# Patient Record
(demographics unavailable — no encounter records)

---

## 2018-01-07 NOTE — RAD
PA AND LATERAL CHEST:

 

HISTORY:

Cough.

 

COMPARISON:

11/04/2016

 

FINDINGS:

The heart size is upper normal with postop sternotomy change.  The lungs are clear.  No evidence of v
ascular congestion.

 

IMPRESSION:

No evidence of acute infiltrate.

 

POS: [facility]

## 2018-06-04 NOTE — HP
PRIMARY CARE PHYSICIAN:  Joaquin Bermudez M.D.

 

PRIMARY CARDIOLOGIST:  Dr. Lee.

 

REASON FOR ADMISSION:  Chest pain.

 

HISTORY OF PRESENT ILLNESS:  A 64-year-old -American female with a history of hypertension, dy
slipidemia, COPD, coronary artery disease and peripheral arterial disease who presented initially to 
[facility] with complaint of chest pain.  She was also having dyspnea on exertion.  
The patient reports that first time chest pain started yesterday around 3:00 p.m.  At that time, she 
was feeling nausea.  Her pain was left-sided in location, 6/10 in intensity and subsided within an ho
ur.  She was continued to complain of shortness of breath with some intermittent wheezing.  She was a
ble to go to sleep without any problem, but around 3:00 a.m., she woke up with acute onset of chest p
ain and her chest pain intensity was more severe than at during daytime.  It was radiating to left ar
m and associated with shortness of breath.  She denies any associated palpitation.  She denies any re
lation of chest pain with food, respiration or activity.  It was constant from 3:00 a.m. to 8:00 a.m.
 without any change.

 

She was evaluated at [facility].  Over there, her electrocardiograph was unremarkabl
e.  Her routine blood tests including cardiac enzyme were negative and subsequently she was transferr
ed to our emergency room for rule out ACS.  In our emergency room, patient was wheezing and that is w
hy she was given DuoNeb therapy.  After that, she was feeling better.

 

She denies any hemoptysis.  She denies any calf tenderness.  She denies any fever or chills.  She den
ies any UTI symptoms.

 

PAST MEDICAL HISTORY:  Diabetes type 2, hypertension, dyslipidemia, coronary artery disease, COPD, pe
ripheral arterial disease, osteoarthritis, gastroesophageal reflux disease, history of polysubstance 
abuse in past.

 

PAST SURGICAL HISTORY:  Thyroid surgery to remove goiter, cholecystectomy in , , CABG x3
 in .

 

PAST PSYCHIATRIC HISTORY:  Reviewed and negative.

 

FAMILY HISTORY:  Hypertension and heart disease runs among several family members.  Diabetes also run
s among several family members.

 

SOCIAL HISTORY:  Patient smokes 5-6 cigars daily basis.  She denies any current illicit drug abuse.  
She drinks alcohol occasionally.

 

ALLERGIES:  No known drug allergy.

 

REVIEW OF SYSTEMS:  The following complete review of systems was negative, unless otherwise mentioned
 in the HPI or below:

Constitutional:  Weight loss or gain, ability to conduct usual activities.

Skin:  Rash, itching.

Eyes:  Double vision, pain.

ENT/Mouth:  Nose bleeding, neck stiffness, pain, tenderness.

Cardiovascular:  Palpitations, dyspnea on exertion, orthopnea.

Respiratory:  Shortness of breath, wheezing, cough, hemoptysis, fever or night sweats.

Gastrointestinal:  Poor appetite, abdominal pain, heartburn, nausea, vomiting, constipation, or diarr
hea.

Genitourinary:  Urgency, frequency, dysuria, nocturia.

Musculoskeletal:  Pain, swelling.

Neurologic/Psychiatric:  Anxiety, depression.

Allergy/Immunologic:  Skin rash, bleeding tendency.

 

Please see my HPI for pertinent positive and negative.  All other review of systems reviewed and nega
tive except as mentioned in the HPI.

 

CURRENT HOME MEDICATIONS:  Aspirin 81 mg p.o. daily, Lipitor 40 mg p.o. at bedtime, Symbicort 2 puffs
 inhalation b.i.d., clonidine 0.1 mg p.o. b.i.d., Lasix 20 mg p.o. daily, hydralazine 50 mg t.i.d., D
uoNeb q.6 hourly p.r.n., metformin 500 mg p.o. b.i.d., Bystolic 20 mg p.o. daily, Phenergan with code
ine 5 mL q.6 hourly p.r.n., valsartan 160 mg p.o. b.i.d., Ventolin inhaler 2 puffs q.4 hourly p.r.n.

 

EMERGENCY ROOM COURSE:  Patient received DuoNeb therapy, nitroglycerin 0.4 mg sublingual x2, aspirin 
234 mg p.o. one time dose is given.

 

Repeat blood test done in our emergency room was unremarkable.

 

PHYSICAL EXAMINATION:

VITAL SIGNS:  Currently, blood pressure 178/92, pulse 86, respiratory rate 20, temperature 98.3, satu
ration 97% on room air, weight 63 kilograms.

GENERAL:  Patient is currently alert, awake, no acute distress.

HEAD:  Normocephalic, atraumatic.

EYES:  Pupils round, reactive to light.  Extraocular muscle intact.

ENT:  Oropharynx within normal limits.  Moist mucous membranes.  No oral lesion, no pharyngeal erythe
ma, no exudate.

NECK:  Supple, no JVD, no thyromegaly, no carotid bruit.

LUNGS:  Few end expiratory wheezing heard.

CARDIAC:  S1 and S2 regular.  No murmur, no gallop, no rub.

ABDOMEN:  Soft, bowel sounds present, nontender, nondistended.  No organomegaly, no mass, no suprapub
ic tenderness.

BACK:  Examination unremarkable, no CVA tenderness.

EXTREMITIES:  Upper extremity passive movement of all joints are normal.  Lower extremities:  No mary ellen
a, no calf tenderness.  Good distal pulsation.

SKIN:  No skin rash.

HEMATOLOGICAL SYSTEM:  No lymphadenopathy.

PSYCHIATRIC:  Normal affect.

 

IMAGING DATA AND SIGNIFICANT LABORATORY DATA:

1.  Chest x-ray based on my review, no acute cardiopulmonary process.

2.  CBC:  WBC 8.8, hemoglobin 14.2, platelet 247.  INR 1.0.

3.  BMP:  Sodium 140, potassium 4.3, chloride 109, carbon dioxide 21, BUN 15, creatinine 0.82, glucos
e 139, calcium 9.6, magnesium 2.2.

4.  LFT:  AST 19, ALT 24, alkaline phosphatase 79, albumin 3.9, CK 67, CK-MB 1.1, troponin I less yadiel
n 0.010 and second set is also negative, BNP 26.8, lipase 28.

5.  EKG normal sinus rhythm within normal limits.

 

ASSESSMENT AND PLAN/IMPRESSION:

1.  Acute chest pain.  Patient does have acute and recurrent chest pain, but cardiac enzymes negative
 x2 and EKG is negative.  Her BNP is normal.  She does not have any hypoxia.  At this point, patient 
is admitted for rule out acute coronary syndrome.  We will perform stress test tomorrow morning for d
iagnostic reason.  Meanwhile, we will continue with aspirin 325 mg p.o. daily, nitropatch q.8 hourly.
  We will check lipid profile for risk stratification and continue Lipitor 40 mg p.o. at bedtime and 
Bystolic 20 mg p.o. daily.

2.  Chronic obstructive pulmonary disease with mild exacerbation with bronchitis.  We will continue w
ith DuoNeb every 6 hourly along with Dulera 2 puffs inhalation b.i.d., Solu-Medrol 20 mg IV q.8 hourl
y, and Mucinex 600 mg twice daily.

3.  Hypertension.  We will continue clonidine 0.1 mg p.o. b.i.d., along with hydralazine 50 mg t.i.d.
, Bystolic 20 mg p.o. daily, and valsartan 160 mg p.o. b.i.d.  We will use hydralazine on p.r.n. basi
s.

4.  Dyslipidemia.  Check lipid profile tomorrow and continue Lipitor 40 mg p.o. at bedtime.

5.  Diabetes type 2.  Continue metformin 500 mg p.o. b.i.d. and insulin as per sliding scale per prot
ocol.  Diabetic diet will be given.

6.  Tobacco abuse disorder.  Smoking cessation counseling given.  Healthy lifestyle measures discusse
d with the patient.

7.  Coronary artery disease.  Continue aspirin, statin therapy and Bystolic as well as losartan as pe
r home dosage.  Patient is admitted for rule out acute coronary syndrome.

8.  Deep venous thrombosis prophylaxis not needed because we are expecting discharge in 24 hours.

9.  Gastrointestinal prophylaxis, Pepcid 20 mg p.o. b.i.d.

10.  Code status:  The patient is FULL CODE.  Patient's daughter is surrogate decision maker.

 

Disposition plan based on stress test result, likely within 24 hours.  Plan of care discussed with th
guilherme patient and family member at bedside in the emergency room.

## 2018-06-04 NOTE — RAD
PORTABLE CHEST 1 VIEW:

 

Date:  06/04/18 

Time:  0849 hours

 

HISTORY:  

Chest pain. 

 

FINDINGS:

 

Comparison made with exam of 01/07/18. 

 

There are changes of median sternotomy. The heart size is borderline. The lungs are well expanded wit
hout focal areas of consolidation, pneumothorax, lalit pulmonary edema, or pleural effusions. 

 

IMPRESSION: 

No acute process. 

 

 

 

POS: OFF

## 2018-06-05 NOTE — PDOC.PN
- Subjective


Encounter Start Date: 06/05/18


Encounter Start Time: 15:12





Ms. Villarreal was seen today in follow-up of chest pain. She says she is feeling 

better now, after she was given a dose of NTG. She denies feeling dizzy or 

lightheaded.





- Objective


Resuscitation Status: 


 











Resuscitation Status           FULL:Full Resuscitation














MAR Reviewed: Yes


Vital Signs & Weight: 


 Vital Signs (12 hours)











  Temp Pulse Resp BP BP Pulse Ox


 


 06/05/18 14:09   77   191/97 H  


 


 06/05/18 14:00   87   145/87 H  


 


 06/05/18 11:44   77    


 


 06/05/18 11:24  98.3 F  59 L  20   191/79 H  97


 


 06/05/18 07:34  97.4 F L  77  17   134/70  95


 


 06/05/18 07:20  97.4 F L  77  17   


 


 06/05/18 06:37   80  14   


 


 06/05/18 04:25  96.5 F L  85  18  144/63 H   93 L








 Weight











Weight                         167 lb 11.2 oz














I&O: 


 











 06/04/18 06/05/18 06/06/18





 06:59 06:59 06:59


 


Intake Total  600 


 


Balance  600 











Result Diagrams: 


 06/04/18 12:54





 06/04/18 12:54


Additional Labs: 


 Accuchecks











  06/05/18 06/04/18 06/04/18





  05:54 22:18 17:06


 


POC Glucose  275 H  123 H  163 H














Phys Exam





- Physical Examination


HEENT: PERRLA


Respiratory: no wheezing, no rales, no rhonchi, clear to auscultation bilateral


Cardiovascular: RRR, no significant murmur, no rub


Gastrointestinal: soft, non-tender, positive bowel sounds


Musculoskeletal: no edema





Dx/Plan


(1) Chest pain


Code(s): R07.9 - CHEST PAIN, UNSPECIFIED   Status: Acute   





(2) Coronary artery disease


Code(s): I25.10 - ATHSCL HEART DISEASE OF NATIVE CORONARY ARTERY W/O ANG PCTRS 

  Status: Acute   





(3) Diabetes mellitus type 2 in nonobese


Code(s): E11.9 - TYPE 2 DIABETES MELLITUS WITHOUT COMPLICATIONS   Status: Acute

   





(4) Hypertension


Code(s): I10 - ESSENTIAL (PRIMARY) HYPERTENSION   Status: Acute   





(5) COPD (chronic obstructive pulmonary disease)


Status: Acute   





(6) Tobacco abuse


Code(s): Z72.0 - TOBACCO USE   Status: Acute   





- Plan





* Chest pain- this is suspicious for angina


* The stress test was cancelled due to some arrhythmia noted earlier today, 

with ST segment changes- discussed with Dr. Haley, who will be assessing the 

patient today. Echo has been ordered


* Tobacco Abuse- discussed cessation in detail


* HTN- blood pressure has been labile- she is back on her usual medications- 

will observe and continue PRN medications


* COPD- stable- again discussed smoking cessation.

## 2018-06-05 NOTE — CON
DATE OF CONSULTATION:  2018

 

HISTORY:  Ms. Ashleigh Villarreal is a pleasant 64-year-old black female who presents 
with chest discomfort.  She was evaluated here in 2013 by Dr. Lee.  She 
had mildly elevated troponin and was to undergo cardiac catheterization; however
, no femoral, popliteal, posterior tibial or dorsalis pedis pulses were felt.  
She underwent a CTA of the abdominal aorta, which revealed that there was 
probable occlusion at the bifurcation.  She therefore underwent cardiac 
catheterization via the radial approach by Dr. Sexton.  There was a 
significant left main disease, distal 50%-60% LAD, 90%-95% stenosis of the 
circumflex and the right coronary artery was totally occluded in its proximal 
portion.  She then underwent CABG x3 by Dr. Riddle with STEINBERG to LAD, saphenous 
vein to the first obtuse marginal and saphenous vein to the diagonal (with a 
description of the procedure, the diagonal was not mentioned; however, the 
procedure in detail mentioned that there was a graft to the diagonal.)  The 
second obtuse marginal, third obtuse marginal, fourth obtuse marginal and right 
posterior descending artery were all felt to be too small and heavily diseased 
for bypass.  Apparently, her postop course was unremarkable.  It is also of 
note that during that admission, she had positive urine drug screen for cocaine 
and cannabinoids.  She apparently has not returned for Cardiology follow up 
since that time.  She was hospitalized in 2016 with COPD exacerbation.

 

She denies any exertional chest discomfort, it is mostly limited by pain in her 
right leg with walking.  Then, over the past 8 days, she has started to have 
chest pressure associated with shortness of breath and diaphoresis.  The 
episodes would last anywhere from 3-20 minutes.  The pain became more intense 
and she went to the hospital in [place] and was transferred here for 
further evaluation.  She was scheduled to undergo a Lexiscan Cardiolite testing
; however, it was noted that she had an episode of chest discomfort early this 
morning at 0113.  This was accompanied by a 2 mm of downsloping ST segment 
depression.  With this finding associated with chest pain, it was felt that she 
should not undergo Cardiolite testing.

 

PAST MEDICAL HISTORY:  Diabetes, hyperlipidemia (she states she does not take 
the atorvastatin daily), coronary artery disease, COPD, severe peripheral 
vascular disease with subtotally occluded aorta in , osteoarthritis, GERD, 
polysubstance abuse in the past.

 

OPERATIONS:  CABG, thyroid surgery for goiter,  and cholecystectomy.

 

MEDICATIONS:  At home, aspirin 81 daily, atorvastatin 40 at bedtime, carvedilol 
25 b.i.d., clonidine 0.1 mg b.i.d. p.r.n., Lasix 20 mg daily, hydralazine 50 
t.i.d., metformin 500 b.i.d., valsartan 160 b.i.d., Ventolin 2 puffs q.4 hours 
p.r.n.

 

ALLERGIES:  None.

 

SOCIAL HISTORY:  She continues to smoke 3-4 cigarettes per day.  She 
occasionally drinks alcohol.  She abuse cocaine in the past.

 

FAMILY HISTORY:  Heart disease in family members.

 

REVIEW OF SYSTEMS:  Twelve point review of systems otherwise unremarkable.

 

PHYSICAL EXAMINATION:

VITAL SIGNS:  Blood pressure 191/97, pulse of 77.

HEENT:  PERRL.

NECK:  Supple.

LUNGS:  Chest revealed bilateral rhonchi.

CARDIOVASCULAR:  S1, S2 normal, without any S3 or S4.  There is a 2/6 systolic 
ejection murmur in the aortic area.  Carotid upstrokes normal without bruits.  
Dorsalis pedis, posterior tibial pulses, femoral pulses are absent.

ABDOMEN:  Normal bowel sounds, without tenderness.

EXTREMITIES:  Revealed no clubbing, cyanosis or edema.

NEUROLOGIC:  Grossly intact.

 

LABORATORY DATA AND IMAGING:  EKG reveals normal sinus rhythm, possible left 
atrial enlargement.  She does have ST segment depression with chest pain as 
noted above.  CBC is unremarkable.  INR 1.0.  Sodium 138, potassium 4.2, 
chloride 109, carbon dioxide 20, BUN 12, creatinine 0.76.  Troponin I is 0.044.
  BNP 55.1.  Cholesterol 265, triglycerides 113, LDL 99, HDL 43.

 

IMPRESSION:

1.  Acute coronary syndrome with ST segment depression with chest discomfort, 
borderline troponin.

2.  Status post coronary artery bypass graft x3 in 2013 with a STEINBERG to LAD, 
vein graft to the diagonal and the first obtuse marginal.  The second, third, 
and fourth obtuse marginals and right posterior descending were too smaller 
disease for bypass.

3.  Aortic murmur.

4.  Subtotal versus totally occluded distal aorta on CT of the abdomen in 2013.

5.  Hypertension.

6.  Hypercholesterolemia, poorly controlled, noncompliant with atorvastatin.

7.  Diabetes.

8.  The patient continues to smoke.

9.  Positive family history.

10.  History of cocaine abuse.

 

PLAN:  Atorvastatin will be increased to 40 mg daily.  Urine drug screen will 
be obtained.  Carvedilol dose will be increased for better blood pressure 
control.  Topical nitrates will also be added.  She will be given one shot of 
Lovenox 1 mg/kg at this time.

 

Ms. Villarreal represents a significant problem in terms of the subtotally occluded 
aorta and probable inability to have her undergo cardiac catheterization from 
the femoral approach.  Probable only approach should be through a radial artery
; however, opacifying bypass grafts from this approach is extremely difficult.  
Further consultation will be held with cardiologist who do radial approach, in 
the meantime, her medications will be further increased.

 

GELACIO

## 2018-06-05 NOTE — NM
MYOCARDIAL PERFUSION EXAM:

 

INDICATIONS:

Chest pain.

 

TECHNIQUE:

The patient was given 9 millicuries of technetium sestamibi.  A rest only study was performed.  The e
xam was canceled by the cardiologist after the rest only study.

 

Activity is seen throughout the left ventricle on the rest only study.  Mild thinning of the inferior
 wall.

 

FINDINGS:

Rest only study shows no definite defect.

 

POS: [facility]

## 2018-06-06 NOTE — PDOC.PN
- Subjective


Encounter Start Date: 06/06/18


Encounter Start Time: 13:42





Ms. Villarreal was seen today in follow-up. She has not had any chest pain today. 

She is breathing ok, and denies dyspnea.





- Objective


Resuscitation Status: 


 











Resuscitation Status           FULL:Full Resuscitation














MAR Reviewed: Yes


Vital Signs & Weight: 


 Vital Signs (12 hours)











  Temp Pulse Resp BP BP Pulse Ox


 


 06/06/18 12:00   80  14   


 


 06/06/18 10:40  97.9 F  89  12   144/67 H  95


 


 06/06/18 10:17   78   144/75 H  


 


 06/06/18 08:00  98.2 F  78  12   


 


 06/06/18 07:27  98.2 F  78  12   144/65 H  95


 


 06/06/18 06:44   90  14   


 


 06/06/18 04:10  97.7 F  83  18   133/60  96


 


 06/06/18 01:50   98  18    96








 Weight











Weight                         167 lb 11.2 oz














I&O: 


 











 06/05/18 06/06/18 06/07/18





 06:59 06:59 06:59


 


Intake Total 600 480 


 


Output Total  1200 


 


Balance 600 -720 











Result Diagrams: 


 06/04/18 12:54





 06/04/18 12:54


Additional Labs: 


 Accuchecks











  06/06/18 06/06/18 06/05/18





  10:42 07:01 20:44


 


POC Glucose  190 H  201 H  162 H














  06/05/18 06/05/18 06/05/18





  18:31 16:20 11:30


 


POC Glucose  217 H  250 H  161 H














Phys Exam





- Physical Examination


HEENT: PERRLA


Respiratory: no wheezing, no rales, no rhonchi, clear to auscultation bilateral


Cardiovascular: RRR, no significant murmur, no rub


Gastrointestinal: soft, non-tender, positive bowel sounds


Musculoskeletal: no edema





Dx/Plan


(1) Chest pain


Code(s): R07.9 - CHEST PAIN, UNSPECIFIED   Status: Acute   





(2) Coronary artery disease


Code(s): I25.10 - ATHSCL HEART DISEASE OF NATIVE CORONARY ARTERY W/O ANG PCTRS 

  Status: Acute   





(3) Diabetes mellitus type 2 in nonobese


Code(s): E11.9 - TYPE 2 DIABETES MELLITUS WITHOUT COMPLICATIONS   Status: Acute

   





(4) Hypertension


Code(s): I10 - ESSENTIAL (PRIMARY) HYPERTENSION   Status: Acute   





(5) COPD (chronic obstructive pulmonary disease)


Status: Acute   





(6) Tobacco abuse


Code(s): Z72.0 - TOBACCO USE   Status: Acute   





- Plan





* Unstable Angina - She has a history of coronary artery disease, and developed 

an arrhythmia with ST segment changes- therefore will change her status to in-

patient.  She has been evaluated by Dr. Baer who plans on performing a 

cardiac cath


* UDS was noted, and the dangers and need to stop cocaine use was discussed.  (

She admits to using some on the weekends).


* HTN- blood pressure is a bit elevated- will monitor


* Dyslipidemia- she has been encouraged to be complaint with Lipitor


* Tobacco abuse- discussed smoking cessation


* DM- blood glucose is stable

## 2018-06-06 NOTE — PRG
DATE OF SERVICE:  06/06/2018

 

Ms. Villarreal is resting comfortably this morning.

 

No chest pain or pressure this morning.

 

PHYSICAL EXAMINATION: 

VITAL SIGNS:  Blood pressure 133/60, pulse is 90.  

 

I reviewed the situation with the patient.  The patient does admit to using cocaine sporadically prio
r to admission, smoking about 3 cigarettes per day.  She said she was not taking the atorvastatin on 
a regular basis.  She was taking aspirin.

 

ASSESSMENT:

1.  Unstable angina.

2.  Cocaine addiction.

3.  Previous bypass.

4.  Severe peripheral vascular disease.

5.  Diabetes.

6.  Noncompliance with medicines.

 

PLAN:  Dr. Baer will see the patient today to assess whether she is a candidate for cardiac cathete
rization.  It is very concerning that she has not been compliant with medicines.  She said she was co
mpliant with aspirin, but was taking atorvastatin sporadically.  The LDL reflects that as the LDL is 
199.

## 2018-06-07 NOTE — PDOC.PN
- Subjective


Encounter Start Date: 06/07/18


Encounter Start Time: 10:58





Ms. Villarreal was seen today in follow-up. She does not have any complaints today. 

She admits to some chest pain early this morning, after getting up to go to the 

bathroom. She says it was relieved with nitroglycerin after about 5 minutes.





- Objective


MAR Reviewed: Yes


Vital Signs & Weight: 


 Vital Signs (12 hours)











  Temp Pulse Resp BP Pulse Ox


 


 06/07/18 08:00  97 F L  83  17  144/95 H  94 L


 


 06/07/18 05:45   82   


 


 06/07/18 03:34     156/73 H 


 


 06/07/18 03:28  97.9 F  82  20  192/86 H  93 L


 


 06/07/18 00:46   90   134/64 


 


 06/07/18 00:40   89   129/65 


 


 06/07/18 00:32   88   196/93 H 


 


 06/07/18 00:12   85  12  


 


 06/07/18 00:00  98 F  59 L  16  141/62 H  97











I&O: 


 











 06/06/18 06/07/18 06/08/18





 06:59 06:59 06:59


 


Intake Total  790 


 


Balance  790 











Result Diagrams: 


 06/04/18 12:54





 06/04/18 12:54


Additional Labs: 


 Accuchecks











  06/07/18 06/07/18 06/06/18





  10:42 06:04 21:33


 


POC Glucose  275 H  260 H  261 H














  06/06/18 06/06/18





  16:36 14:07


 


POC Glucose  290 H  147 H














Phys Exam





- Physical Examination


HEENT: PERRLA


Respiratory: no wheezing, no rales, no rhonchi


Cardiovascular: RRR, no significant murmur, no rub


Gastrointestinal: soft, non-tender, positive bowel sounds


Musculoskeletal: no edema





Dx/Plan


(1) Chest pain


Code(s): R07.9 - CHEST PAIN, UNSPECIFIED   Status: Acute   





(2) Coronary artery disease


Code(s): I25.10 - ATHSCL HEART DISEASE OF NATIVE CORONARY ARTERY W/O ANG PCTRS 

  Status: Acute   





(3) Diabetes mellitus type 2 in nonobese


Code(s): E11.9 - TYPE 2 DIABETES MELLITUS WITHOUT COMPLICATIONS   Status: Acute

   





(4) Hypertension


Code(s): I10 - ESSENTIAL (PRIMARY) HYPERTENSION   Status: Acute   





(5) COPD (chronic obstructive pulmonary disease)


Status: Acute   





(6) Tobacco abuse


Code(s): Z72.0 - TOBACCO USE   Status: Acute   





- Plan





* Unstable Angina- Cath results were noted. She will be treated medically


* HTN- her blood pressure is not optimally controlled- will add Norvasc


* DM- blood glucose is slightly elevated- but will need to hold Metformin due 

to Cardiac Cath this morning.


* COPD- stable


* Disposition per Cardiology

## 2018-06-07 NOTE — PQF
CLINICAL DOCUMENTATION IMPROVEMENT CLARIFICATION FORM:  ICD-10 Updated



PLEASE DO AN ADDENDUM TO THE PROGRESS NOTE WITH ANY DOCUMENTATION UPDATES OR 
ADDITIONS AND CARRY THROUGH TO DC SUMMARY.   THANK YOU.





DATE:     6/7                                                                  
    ATTN:  DR. JODIE ISLAS



Please exercise your independent, professional judgment in responding to the 
clarification form. Clinical indicators are provided on the bottom of this form 
for your review



Please check appropriate box(s):



                         ELEVATED TROPONIN I



[  ]  D/T Demand Ischemia

[  ]  Not D/T Demand Ischemia



[  ] Other diagnosis ___________

[  ] Unable to determine



For continuity of documentation, please document condition throughout progress 
notes and discharge summary.  Thank You.



CLINICAL INDICATORS present in the medical record:



PHYSICIAN H&P DOCUMENTATION 6/4:  1)  PT DOES HAVE ACUTE & RECURRENT CHEST PAIN
, BUT CARDIAC ENZYMES NEG X2



CARDIOLOGY CONSULT 6/5:  1)  ACUTE CORONARY SYNDROME W/ST SEGMENT DEPRESSION 
WITH CHEST DISCOMFORT, BORDERLINE TROPONIN



TROP:  0.021, 0.044, 0.043, 0.036, 0.030, 0.029       (6/4 - 5)

 

RISK FACTORS:

CAD W/PAST CABG

CURRENT TOBACCO ABUSE

COCAINE ABUSE (POSITIVE UDS)

HTN

DM II



TREATMENTS:

CARDIOLOGY CONSULT

L HEART CATH

SERIAL CARDIAC ENZYMES





 THANK YOU!  Fiordaliza



(This form is maintained as a part of the permanent medical record)

 2015 [employer].  All Rights Reserved

Fiordaliza Schroeder RN, BSN    [email or web address]    Office:  [phone]

                                                              

[facility] Center[facility]

## 2018-06-08 NOTE — PRG
DATE OF SERVICE:  06/08/2018

 

HISTORY:  Ms. Villarreal is doing better.  She did have one episode of chest discomfort this morning, res
olved with nitroglycerin.

 

PHYSICAL EXAMINATION:

VITAL SIGNS:  Blood pressure was high 175/81, pulse 84.

LUNGS:  Clear.

CARDIAC:  Normal S1, S2.

ABDOMEN:  Soft, nontender.

EXTREMITIES:  There is no edema.

 

ASSESSMENT:

1.  Coronary artery disease.  Medical therapy being the only option due to distal atherosclerosis.

2.  Patent bypass grafts as outlined in the chart.

 

PLAN:

1.  Increase amlodipine to 10 mg a day.

2.  Other medicines unchanged.

3.  Add Plavix 75 mg daily and continue aspirin as well as statin and all the other medicine.  Okcurtis w
ith me to be released home.  Medical therapy is the only option.

 

The patient understands it is critical to take medicines as prescribed and avoid all cocaine or other
 drugs.

## 2019-01-09 NOTE — RAD
2 VIEWS CHEST:

 

Date:  01/09/19 

 

COMPARISON:  

01/07/18. 

 

HISTORY:  

Chest pain. 

 

FINDINGS:

Midline sternotomy wires are present. Heart and mediastinal contours are stable. Mild increased linea
r interstitial density noted with no pneumothorax, pleural fluid, focal consolidation, or alveolar ed
ema. 

 

IMPRESSION: 

No focal consolidation or alveolar edema. 

 

 

POS: [facility]

## 2019-01-09 NOTE — HP
PRIMARY CARE PHYSICIAN:  Joaquin Bermudez MD.



CHIEF COMPLAINT:  Chest pressure pain and shortness of breath.



HISTORY OF PRESENTING ILLNESS:  Ms. Villarreal is a pleasant 65-year-old Afro-American

female with known history of coronary artery disease, cocaine abuse, COPD, and

diabetes, who presented to the ER in outside hospital with the above-mentioned

complaint.  History is mainly obtained by the patient herself.  Electronic medical

records have been reviewed and the case has been discussed with admitting ER

physician. 



Ms. Villarreal reports that she ran out of her sublingual nitroglycerin 2 months ago.

She started to have symptoms only 2 or 3 days ago.  She reports a chest pressure

that builds up and does not go away and is associated with mild shortness of breath

and some diaphoresis.  Initially, she thought that this is gastroesophageal reflux,

but it kept coming on even when she has not eaten anything.  She got concerned and

came to the emergency room. 



She denies any recent illnesses.  She was last admitted to our facility in 2018

and underwent a cardiac catheterization at that time.  She has history of coronary

artery disease and bypass graft in .  Catheterization in 2018 showed

diffuse atherosclerosis and medical therapy was recommended.  The patient has failed

to follow up with a cardiologist, Dr. Lee since then, but reports that she did go

and see Dr. Riddle for followup for some reason.  Please note that the patient was

discharged on aspirin, Plavix, statin during her last hospitalization, among others.

 At this time, she reports compliance with everything except for sublingual

nitroglycerin which she ran out of. 



Please note that the patient is still smoking at least 2 cigarettes a day and on and

off cocaine abuse.  Her last cocaine use was 3 days ago when she had a visit from

her friend. 



Upon presentation to the ER, she was hemodynamically stable with a blood pressure of

159/71, saturating 93% on room air, pulse is 76.  She was found to have mild

wheezing on examination and some shortness of breath.  She received nebulizers.

Further evaluation showed elevation of cardiac enzymes.  Initial troponin was 0.088

with repeat troponin of 0.102 and with repeat troponin of 0.150.  She received 1

mg/kg of Lovenox along with aspirin and transdermal nitroglycerin, sublingual

nitroglycerin and was transferred to our facility for further evaluation and

possible admission for non-ST elevation MI.  Chest x-ray is clear. 



She is now being admitted for same.



PAST MEDICAL HISTORY:  

1. History of coronary artery disease, status post CABG in 2013.

2. Cocaine abuse.

3. Tobacco abuse.

4. Diabetes mellitus type 2.

5. Hypertension.

6. Dyslipidemia.

7. COPD.

8. Peripheral arterial disease.

9. Osteoarthritis.



PAST SURGICAL HISTORY:  Coronary artery bypass graft in , thyroid surgery to

remove goiter, cholecystectomy in , . 



PSYCHIATRIC HISTORY:  Reviewed and negative.



FAMILY HISTORY:  Hypertension, heart disease run among several family members,

diabetes also run among several family members. 



SOCIAL HISTORY:  Smokes 1-2 cigarettes daily.  Snorts cocaine infrequently.  No

alcohol abuse. 



ALLERGIES:  NO KNOWN DRUG ALLERGIES.



CURRENT MEDICATIONS:  Unknown.  Does take metformin and multiple blood pressure

medication according to the patient. 



REVIEW OF SYSTEMS:  A 12-point review of system is done and is negative except for

those mentioned in the history and physical. 



CODE STATUS:  Full code discussed with the patient.



LABORATORY DATA:  CBC unremarkable.  Serum chemistries; chloride 110, bicarb 20.

BNP normal.  Cardiac enzymes, troponin as above.  Lipase normal. 



DIAGNOSTIC DATA:  Chest x-ray by my review has no evidence to suggest pleural

effusion, edema, or infiltrate.  A 12-lead EKG shows nonspecific ST and T-wave

changes with normal sinus rhythm by my review. 



PHYSICAL EXAMINATION:

VITAL SIGNS:  Upon presentation to the emergency room, blood pressure 159/71, pulse

is 76, respirations 17, saturating 93% on room air. 

GENERAL:  Lying comfortably in bed.  Trying to go to sleep.  No acute distress.

Awake, alert, and oriented x3. 

HEENT:  Mucous membrane is moist and pink.  No oropharyngeal exudate or erythema.

Head is normocephalic, atraumatic.  Pupils equal, reactive to light and

accommodation.  Extraocular movement intact. 

NECK:  Supple without any lymphadenopathy, JVD, or bruit. 

CHEST:  Clear to auscultation without any wheezing, rales, or rhonchi.  Rate and

rhythm regular without any murmurs, rubs, or gallops. 

ABDOMEN:  Obese, soft, nontender, nondistended with positive bowel sounds. 

EXTREMITIES:  Free of any cyanosis, clubbing, or edema. 

NEUROLOGICAL:  Nonfocal. 

SKIN:  Free of any rashes or bruises.  Feels warm and dry to touch. 

PSYCHIATRIC:  Normal affect.



IMPRESSION AND PLAN:  

1. Unstable angina.  The patient has known history of coronary artery disease and

inoperable atherosclerosis.  Medical management was recommended during her last

hospitalization.  At this time, we will also treat her medically with daily b.i.d.

Lovenox at therapeutic dose along with continuation of aspirin.  I am not sure if

the patient is still taking the Plavix or not.  I will restart her on sublingual

nitroglycerin and transdermal nitroglycerin as well as isosorbide for her anginal

symptoms.  At this time, she is hemodynamically stable.  We will have Cardiology see

her in the morning and perform a transthoracic echocardiogram. 

2. Chronic obstructive pulmonary disease, currently well controlled.  We will use

nebulizers as needed and restart her home medication that includes Symbicort. 

3. Hypertension.  Restart home medications once confirmed.

4. Dyslipidemia.  Restart statin once the dose is confirmed.

5. Diabetes mellitus type 2.  Hold metformin while she is in the hospital and start

her on insulin sliding scale with frequent Accu-Cheks. 

6. Cocaine abuse and tobacco abuse.  Extensive counseling has been provided.

7. Code status.  Full code discussed with the patient.

8. DVT and GI prophylaxis.



DISPOSITION:  Ms. Villarreal is currently being admitted to the hospital for unstable

angina. 



ESTIMATED LENGTH OF STAY:  At this time is at least 2 to 3 midnights.  Further

management will depend upon her clinical course. 







Job ID:  [id number]

## 2019-01-10 NOTE — PDOC.PN
- Subjective


Encounter Start Date: 01/10/19


Encounter Start Time: 15:15


Subjective: feels well. no more CP/SOB





- Objective


MAR Reviewed: Yes


Vital Signs & Weight: 


 Vital Signs (12 hours)











  Temp Pulse Resp BP Pulse Ox


 


 01/10/19 11:30  98.0 F  65  17  128/59 L  99


 


 01/10/19 08:09  97.8 F  81  16  166/86 H  100


 


 01/10/19 07:23   65  16   98


 


 01/10/19 04:35  98.2 F  64  22 H  120/57 L  97








 Weight











Weight                         162 lb 12.8 oz














I&O: 


 











 01/09/19 01/10/19 01/11/19





 06:59 06:59 06:59


 


Intake Total  350 


 


Output Total  300 


 


Balance  50 











Result Diagrams: 


 01/10/19 05:05





 01/10/19 05:05


Additional Labs: 


 Accuchecks











  01/10/19 01/10/19 01/09/19





  12:41 05:46 21:03


 


POC Glucose  343 H  140 H  162 H














Phys Exam





- Physical Examination


Constitutional: NAD


HEENT: PERRLA, moist MMs, sclera anicteric, oral pharynx no lesions


Neck: no nodes, no JVD, supple, full ROM


Respiratory: no wheezing, no rales, no rhonchi, clear to auscultation bilateral


Cardiovascular: RRR, no significant murmur, no rub, irregular


Gastrointestinal: soft, non-tender, no distention, positive bowel sounds


Musculoskeletal: no edema, pulses present


Neurological: non-focal, normal sensation, moves all 4 limbs


Psychiatric: normal affect, A&O x 3





Dx/Plan


(1) NSTEMI (non-ST elevated myocardial infarction)


Code(s): I21.4 - NON-ST ELEVATION (NSTEMI) MYOCARDIAL INFARCTION   Status: 

Acute   





(2) COPD (chronic obstructive pulmonary disease)


Status: Acute   





(3) Coronary artery disease


Code(s): I25.10 - ATHSCL HEART DISEASE OF NATIVE CORONARY ARTERY W/O ANG PCTRS 

  Status: Acute   





(4) Diabetes mellitus type 2 in nonobese


Code(s): E11.9 - TYPE 2 DIABETES MELLITUS WITHOUT COMPLICATIONS   Status: Acute

   





(5) Hypertension


Code(s): I10 - ESSENTIAL (PRIMARY) HYPERTENSION   Status: Acute   





(6) Tobacco abuse


Code(s): Z72.0 - TOBACCO USE   Status: Acute   





- Plan


respiratory therapy, incentive spirometry, DVT proph w/SCDs


cont bid lovenox.cont ASa,statin,ARB,BB


-: Imdur for aniginal symptoms


-: smoking and cocaine cessation highly recommenede


-: ECHO pending. Cardiology recs requested





* .








Review of Systems





- Review of Systems


Constitutional: negative: fever, chills, sweats, weakness, malaise, other


ENT: negative: Ear Pain, Ear Discharge, Nose Pain, Nose Discharge, Nose 

Congestion, Mouth Pain, Mouth Swelling, Throat Pain, Throat Swelling, Other


Respiratory: negative: Cough, Dry, Shortness of Breath, Hemoptysis, SOB with 

Excertion, Pleuritic Pain, Sputum, Wheezing


Cardiovascular: negative: chest pain, palpitations, orthopnea, paroxysmal 

nocturnal dyspnea, edema, light headedness, other


Gastrointestinal: negative: Nausea, Vomiting, Abdominal Pain, Diarrhea, 

Constipation, Melena, Hematochezia, Other


Genitourinary: negative: Dysuria, Frequency, Incontinence, Hematuria, Retention

, Other


Neurological: negative: Weakness, Numbness, Incoordination, Change in Speech, 

Confusion, Seizures, Other





- Medications/Allergies


Allergies/Adverse Reactions: 


 Allergies











Allergy/AdvReac Type Severity Reaction Status Date / Time


 


No Known Drug Allergies Allergy Unknown  Verified 06/04/18 15:32











Medications: 


 Current Medications





Acetaminophen (Tylenol)  650 mg PO Q4H PRN


   PRN Reason: Headache/Fever/Mild Pain (1-3)


Albuterol/Ipratropium (Duoneb)  3 ml NEB [id number] PRN


   PRN Reason: SOB &/or Wheezing


   Last Admin: 01/09/19 22:08 Dose:  3 ml


Aspirin (Aspirin Chewable)  81 mg PO QAM-WM [facility]


Atorvastatin Calcium (Lipitor)  40 mg PO HS [facility]


Benzonatate (Tessalon)  100 mg PO Q6H PRN


   PRN Reason: Cough


Bisacodyl (Dulcolax)  10 mg PO DAILYPRN PRN


   PRN Reason: Constipation


Clonidine (Catapres)  0.1 mg PO Q4H PRN


   PRN Reason: SBP > 160____


   Last Admin: 01/09/19 22:46 Dose:  0.1 mg


Clonidine (Catapres)  0.1 mg PO BID [facility]


Clopidogrel Bisulfate (Plavix)  75 mg PO DAILY [facility]


   Last Admin: 01/10/19 09:30 Dose:  75 mg


Dextrose/Water (Dextrose 50%)  25 gm SLOW IVP PRN PRN


   PRN Reason: Hypoglycemia


Enoxaparin Sodium (Lovenox)  60 mg SC 0900,2100 [facility]


   Last Admin: 01/10/19 09:04 Dose:  60 mg


Famotidine (Pepcid)  20 mg PO BID [facility]


   Last Admin: 01/09/19 20:46 Dose:  20 mg


Glucagon (Glucagon)  1 mg IM PRN PRN


   PRN Reason: Hypoglycemia


Hydralazine HCl (Apresoline)  10 mg SLOW IVP Q4H PRN


   PRN Reason: SBP > 180 and HR < 70


Dextrose/Water (D5w)  1,000 mls @ 0 mls/hr IV .Q0M PRN


   PRN Reason: Hypoglycemia


Insulin Human Lispro (Humalog)  0 units SC .MODERATE SLIDING SC PRN


   PRN Reason: Moderate Correctional Scale


   Last Admin: 01/10/19 12:59 Dose:  8 units


Insulin Human Lispro (Humalog)  0 units SC .BEDTIME SLIDING SC PRN


   PRN Reason: Bedtime Correctional Scale


Isosorbide Mononitrate (Imdur Er)  30 mg PO DAILY [facility]


   Last Admin: 01/10/19 09:04 Dose:  30 mg


Mometasone Furoate/Formoterol Fumar (Dulera 100 Mcg/5 Mcg Inhaler)  1 puff INH 

BID-RT [facility]


   Last Admin: 01/10/19 07:23 Dose:  1 puff


Nitroglycerin (Nitrostat)  0.4 mg SL Q5MIN PRN


   PRN Reason: Chest Pain


   Last Admin: 01/10/19 05:26 Dose:  0.4 mg


Nitroglycerin (Nitro-Bid 2% Ointment)  0.5 inch TOP Q8HR [facility]


   Last Admin: 01/10/19 14:38 Dose:  0.5 inch


Ondansetron HCl (Zofran)  4 mg IVP Q6H PRN


   PRN Reason: Nausea/Vomiting


Senna/Docusate Sodium (Senokot S)  2 tab PO BID PRN


   PRN Reason: Constipation


Sodium Chloride (Flush - Normal Saline)  10 ml IVF Q12HR [facility]


   Last Admin: 01/10/19 08:18 Dose:  10 ml


Sodium Chloride (Flush - Normal Saline)  10 ml IVF PRN PRN


   PRN Reason: Saline Flush


Valsartan (Diovan)  160 mg PO BID [facility]

## 2019-01-11 NOTE — PDOC.PN
- Subjective


Encounter Start Date: 01/11/19


Encounter Start Time: 14:03


Subjective: feels well. no more chest pain.no new complaints





- Objective


MAR Reviewed: Yes


Vital Signs & Weight: 


 Vital Signs (12 hours)











  Temp Pulse Resp BP BP Pulse Ox


 


 01/11/19 11:30  98.1 F  71  19   139/68  100


 


 01/11/19 09:24      151/72 H 


 


 01/11/19 08:28  97.1 F L   18   191/88 H  100


 


 01/11/19 07:05   73  16    95


 


 01/11/19 03:19  98.0 F  63  20  150/67 H   97








 Weight











Weight                         166 lb 3.2 oz














I&O: 


 











 01/10/19 01/11/19 01/12/19





 06:59 06:59 06:59


 


Intake Total 350 938 


 


Output Total 300 200 


 


Balance 50 738 











Result Diagrams: 


 01/11/19 05:20





 01/11/19 05:20


Additional Labs: 


 Accuchecks











  01/11/19 01/11/19 01/10/19





  10:57 05:15 20:25


 


POC Glucose  122 H  180 H  192 H














  01/10/19





  16:39


 


POC Glucose  136 H














Phys Exam





- Physical Examination


Constitutional: NAD


HEENT: PERRLA, moist MMs, sclera anicteric, oral pharynx no lesions


Neck: no nodes, no JVD, supple, full ROM


Respiratory: no wheezing, no rales, no rhonchi, clear to auscultation bilateral


Cardiovascular: RRR, no significant murmur, no rub


Gastrointestinal: soft, non-tender, no distention, positive bowel sounds


Musculoskeletal: no edema, pulses present


Neurological: non-focal, normal sensation, moves all 4 limbs


Psychiatric: normal affect, A&O x 3


Skin: no rash





Dx/Plan


(1) NSTEMI (non-ST elevated myocardial infarction)


Code(s): I21.4 - NON-ST ELEVATION (NSTEMI) MYOCARDIAL INFARCTION   Status: 

Acute   Comment: Lovenox BID.Satrted on ASA,statin,BB,Imdur,ARB,Plavix   





(2) COPD (chronic obstructive pulmonary disease)


Status: Acute   





(3) Coronary artery disease


Code(s): I25.10 - ATHSCL HEART DISEASE OF NATIVE CORONARY ARTERY W/O ANG PCTRS 

  Status: Acute   Comment: inoperable disease.Medical management   





(4) Diabetes mellitus type 2 in nonobese


Code(s): E11.9 - TYPE 2 DIABETES MELLITUS WITHOUT COMPLICATIONS   Status: Acute

   





(5) Hypertension


Code(s): I10 - ESSENTIAL (PRIMARY) HYPERTENSION   Status: Acute   





(6) Tobacco abuse


Code(s): Z72.0 - TOBACCO USE   Status: Acute   





- Plan


DVT proph w/SCDs


cont BID lovenox for another 24 hours or as per cardiology.


-: started on appropriate cardiac meds.compliance encouraged


-: HD stable.


-: DC home next 24 hours





* .








Review of Systems





- Review of Systems


Constitutional: negative: fever, chills, sweats, weakness, malaise, other


ENT: negative: Ear Pain, Ear Discharge, Nose Pain, Nose Discharge, Nose 

Congestion, Mouth Pain, Mouth Swelling, Throat Pain, Throat Swelling, Other


Respiratory: negative: Cough, Dry, Shortness of Breath, Hemoptysis, SOB with 

Excertion, Pleuritic Pain, Sputum, Wheezing


Cardiovascular: negative: chest pain, palpitations, orthopnea, paroxysmal 

nocturnal dyspnea, edema, light headedness, other


Gastrointestinal: negative: Nausea, Vomiting, Abdominal Pain, Diarrhea, 

Constipation, Melena, Hematochezia, Other


Genitourinary: negative: Dysuria, Frequency, Incontinence, Hematuria, Retention

, Other


Musculoskeletal: negative: Neck Pain, Shoulder Pain, Arm Pain, Back Pain, Hand 

Pain, Leg Pain, Foot Pain, Other


Neurological: negative: Weakness, Numbness, Incoordination, Change in Speech, 

Confusion, Seizures, Other





- Medications/Allergies


Allergies/Adverse Reactions: 


 Allergies











Allergy/AdvReac Type Severity Reaction Status Date / Time


 


No Known Drug Allergies Allergy Unknown  Verified 06/04/18 15:32











Medications: 


 Current Medications





Acetaminophen (Tylenol)  650 mg PO Q4H PRN


   PRN Reason: Headache/Fever/Mild Pain (1-3)


Albuterol/Ipratropium (Duoneb)  3 ml NEB Q3AR-XS PRN


   PRN Reason: SOB &/or Wheezing


   Last Admin: 01/09/19 22:08 Dose:  3 ml


Aspirin (Aspirin Chewable)  81 mg PO QAM-WM [facility]


   Last Admin: 01/11/19 08:31 Dose:  81 mg


Atorvastatin Calcium (Lipitor)  40 mg PO HS [facility]


   Last Admin: 01/10/19 20:51 Dose:  40 mg


Benzonatate (Tessalon)  100 mg PO Q6H PRN


   PRN Reason: Cough


Bisacodyl (Dulcolax)  10 mg PO DAILYPRN PRN


   PRN Reason: Constipation


Carvedilol (Coreg)  3.125 mg PO BID-[facility]


   Last Admin: 01/11/19 08:31 Dose:  3.125 mg


Clonidine (Catapres)  0.1 mg PO Q4H PRN


   PRN Reason: SBP > 160____


   Last Admin: 01/09/19 22:46 Dose:  0.1 mg


Clonidine (Catapres)  0.2 mg PO BID [facility]


Clopidogrel Bisulfate (Plavix)  75 mg PO DAILY [facility]


   Last Admin: 01/11/19 08:31 Dose:  75 mg


Dextrose/Water (Dextrose 50%)  25 gm SLOW IVP PRN PRN


   PRN Reason: Hypoglycemia


Enoxaparin Sodium (Lovenox)  60 mg SC 0900,2100 [facility]


   Last Admin: 01/11/19 08:31 Dose:  60 mg


Famotidine (Pepcid)  20 mg PO BID [facility]


   Last Admin: 01/11/19 08:32 Dose:  20 mg


Glucagon (Glucagon)  1 mg IM PRN PRN


   PRN Reason: Hypoglycemia


Hydralazine HCl (Apresoline)  10 mg SLOW IVP Q4H PRN


   PRN Reason: SBP > 180 and HR < 70


Dextrose/Water (D5w)  1,000 mls @ 0 mls/hr IV .Q0M PRN


   PRN Reason: Hypoglycemia


Insulin Human Lispro (Humalog)  0 units SC .MODERATE SLIDING SC PRN


   PRN Reason: Moderate Correctional Scale


   Last Admin: 01/10/19 12:59 Dose:  8 units


Insulin Human Lispro (Humalog)  0 units SC .BEDTIME SLIDING SC PRN


   PRN Reason: Bedtime Correctional Scale


Isosorbide Mononitrate (Imdur)  60 mg PO DAILY [facility]


Mometasone Furoate/Formoterol Fumar (Dulera 100 Mcg/5 Mcg Inhaler)  1 puff INH 

BID-RT [facility]


   Last Admin: 01/11/19 07:05 Dose:  1 puff


Nitroglycerin (Nitrostat)  0.4 mg SL Q5MIN PRN


   PRN Reason: Chest Pain


   Last Admin: 01/10/19 23:20 Dose:  0.4 mg


Nitroglycerin (Nitro-Bid 2% Ointment)  0.5 inch TOP Q8HR [facility]


   Last Admin: 01/11/19 05:07 Dose:  0.5 inch


Ondansetron HCl (Zofran)  4 mg IVP Q6H PRN


   PRN Reason: Nausea/Vomiting


Senna/Docusate Sodium (Senokot S)  2 tab PO BID PRN


   PRN Reason: Constipation


Sodium Chloride (Flush - Normal Saline)  10 ml IVF Q12HR [facility]


   Last Admin: 01/11/19 08:31 Dose:  Not Given


Sodium Chloride (Flush - Normal Saline)  10 ml IVF PRN PRN


   PRN Reason: Saline Flush


Valsartan (Diovan)  160 mg PO BID [facility]


   Last Admin: 01/11/19 08:32 Dose:  160 mg

## 2019-01-11 NOTE — CON
DATE OF CONSULTATION:  



HISTORY OF PRESENT ILLNESS:  The patient is a pleasant 65-year-old woman, who 
presents with recurrent chest discomfort.  The patient has a long history of

coronary artery disease.  She has previously undergone a coronary bypass graft 
surgery in .  She had a LIMA placed to the LAD, saphenous vein graft to OM 
and

diagonal branch.  The patient also has been admitted on several occasions with 
unstable angina.  She most recently underwent a repeat catheterization.  In 
 of

this year, she was found to have no lesions amenable to cardiac intervention 
and has been placed on medical therapy.  The patient unfortunately has a long 
history of

substance abuse.  She continues to use cocaine and to smoke.  The patient 
states that she presents again with increasing chest discomfort.  The patient 
states she

was out of several cardiac medications including aspirin.  The patient also ran 
out of nitroglycerin.  She presented to the local emergency room.  She received 
multiple

doses of sublingual nitroglycerin and eventually, her chest discomfort 
resolved. The patient denies having any present chest discomfort. 



PAST MEDICAL HISTORY:  

1. Coronary artery disease.

2. Hypertension.

3. Peripheral vascular disease.

4. GE reflux.



PAST SURGICAL HISTORY:  Coronary bypass surgery, , and cholecystectomy.



MEDICATIONS:  See nursing list.



ALLERGIES:  NO KNOWN DRUG ALLERGIES.



SOCIAL HISTORY:  The patient continues to use cocaine and smokes several 
cigarettes

a day. 



FAMILY HISTORY:  Positive family history of heart disease.



REVIEW OF SYSTEMS:  Ten-point systems otherwise unremarkable.



PHYSICAL EXAMINATION:

GENERAL:  Obese woman, in no acute distress. 

VITAL SIGNS:  Blood pressure 166/86. 

NECK:  No jugular venous distention. 

LUNGS:  Clear to auscultation. 

HEART:  Regular rate and rhythm.  Normal S1 and S2 with no murmurs. 

ABDOMEN:  Nondistended. 

EXTREMITIES:  Show no edema. 

SKIN:  Warm and dry. 

NEUROLOGIC:  Nonfocal. 

VASCULAR:  Radial pulses are 2+.



LABORATORY RESULTS:  Her white blood cell count 8.9, hemoglobin 11.9, hematocrit

37.7, platelets 229.  Sodium 140, potassium 3.4, chloride 109, bicarb 24, BUN 13
,

creatinine 0.73, glucose 155.  BNP was 87.  EKG revealed her to have normal 
sinus

rhythm with ST-T wave abnormality suggestive of ischemia. 



IMPRESSION:  

1. Unstable angina.

2. History of coronary artery bypass surgery.

3. Hypertension.

4. Diabetes mellitus.

5. Substance abuse.

6. Peripheral vascular disease.

7. Noncompliance.  



This patient presents with unstable angina.  She has been out of her 
medications and  has continued to use cocaine.  From a cardiac standpoint, we 
will treat the patient

for several days with subcutaneous Lovenox.  I have explained the life 
threatening consequences of continued noncompliance with her medications and 
the use of illicit

drugs.  We will follow this patient with you through her hospitalization. 







Job ID:  [id number]



MTDD

## 2019-01-11 NOTE — PQF
CLINICAL DOCUMENTATION IMPROVEMENT CLARIFICATION FORM:  ICD-10 Updated

PLEASE DO AN ADDENDUM TO THE PROGRESS NOTE WITH ANY DOCUMENTATION UPDATES OR 
ADDITIONS AND CARRY THROUGH TO DC SUMMARY.   THANK YOU.



DATE: 1/11/19                                   ATTN: Dr. Calderon



Please exercise your independent, professional judgment in responding to the 
clarification form. 

Clinical indicators are provided on the bottom of this form for your review



Please check appropriate box(s):

[  ] Acute Coronary Syndrome (ACS) without Acute MI meaning Unstable Angina

[ X ] NSTEMI  

[  ] Other diagnosis ___________

[  ] Unable to determine



In addition, please specify:

Present on Admission (POA):  [X ] Yes             [  ] No             [  ] 
Unable to determine



CLINICAL INDICATORS - SIGNS / SYMPTOMS / LABS



H&P 1/9: Initial troponin was 0.088 w/ repeat troponin of 0.102 and repeat 
troponin of 0.150

               She received 1mg/kg of Lovenox along w/ aspirin and transdermal 
nitroglycerin, 

               sublingual nitroglycerin and transferred to our facility for 
further evaluation

              Unstable angina. 



Cardiology 1/10: EKG revealed her to have normal sinus rhythm with ST-T wave 
abnormality 

                                 suggestive of ischemia. 

                         Unstable angina



PN 1/10: NSTEMI (non-ST elevated myocardial infarction). Acute 





RISKS:

H&P: Hx of CAD and bypass graft in 2013. cocaine abuse, COPD, DM. Inoperable 
atherosclerosis



TREATMENT:

H&P: Treat her medically with daily bid Lovenox at therapeutic dose along with 
continuation of aspirin.

_______________________________________________________________________________



Thank you,

Cheyenne

(This form is maintained as a part of the permanent medical record)

 2015 [employer].  All Rights Reserved

Cheyenne Jackson RN, BSN    [email or web address]    Office: [phone]

                                                              

[facility] CenterJOSÉ

## 2019-01-12 NOTE — PDOC.PN
- Subjective


Encounter Start Date: 01/12/19


Encounter Start Time: 13:27


Subjective: on and off chest pain w diaphoresis and SOB





- Objective


MAR Reviewed: Yes


Vital Signs & Weight: 


 Vital Signs (12 hours)











  Temp Pulse Resp BP BP Pulse Ox


 


 01/12/19 12:21  98 F  62  18   117/57 L  99


 


 01/12/19 11:09   69  16    98


 


 01/12/19 09:40     155/75 H  


 


 01/12/19 07:52  97.8 F  64  18   172/77 H  100


 


 01/12/19 07:13       100


 


 01/12/19 07:12   68  16    100


 


 01/12/19 07:10   58 L  16    100


 


 01/12/19 04:34  97.8 F  68  19   139/68  100


 


 01/12/19 03:58   67  16    98








 Weight











Weight                         166 lb 3.2 oz














I&O: 


 











 01/11/19 01/12/19 01/13/19





 06:59 06:59 06:59


 


Intake Total 938 1440 


 


Output Total 200 500 


 


Balance 738 940 











Result Diagrams: 


 01/11/19 05:20





 01/11/19 05:20


Additional Labs: 


 Accuchecks











  01/12/19 01/12/19 01/11/19





  10:47 05:56 21:03


 


POC Glucose  133 H  186 H  256 H














  01/11/19





  16:34


 


POC Glucose  179 H














Phys Exam





- Physical Examination


anxious appearing.lying in bed


HEENT: PERRLA, moist MMs, sclera anicteric, oral pharynx no lesions


Neck: no nodes, no JVD, supple, full ROM


Respiratory: no wheezing, no rales, no rhonchi


Cardiovascular: RRR, no significant murmur


Gastrointestinal: soft, non-tender, no distention, positive bowel sounds


Musculoskeletal: no edema, pulses present


Neurological: non-focal, normal sensation, moves all 4 limbs


Psychiatric: normal affect, A&O x 3


Skin: no rash





Dx/Plan


(1) NSTEMI (non-ST elevated myocardial infarction)


Code(s): I21.4 - NON-ST ELEVATION (NSTEMI) MYOCARDIAL INFARCTION   Status: 

Acute   Comment: Lovenox BID.Satrted on ASA,statin,BB,Imdur,ARB,Plavix   





(2) COPD (chronic obstructive pulmonary disease)


Status: Acute   





(3) Coronary artery disease


Code(s): I25.10 - ATHSCL HEART DISEASE OF NATIVE CORONARY ARTERY W/O ANG PCTRS 

  Status: Acute   Comment: inoperable disease.Medical management   





(4) Diabetes mellitus type 2 in nonobese


Code(s): E11.9 - TYPE 2 DIABETES MELLITUS WITHOUT COMPLICATIONS   Status: Acute

   





(5) Hypertension


Code(s): I10 - ESSENTIAL (PRIMARY) HYPERTENSION   Status: Acute   





(6) Tobacco abuse


Code(s): Z72.0 - TOBACCO USE   Status: Acute   





- Plan


DVT proph w/SCDs


pt candelario having Unstable angina.unfortunately she has inoperable Dz


-: will cont symptom management  & anti thrombotic Rx w BID lovenox


-: PRN SubLingual NG


-: Hd stable.ECHO stable.


-: imdur increased yesterday.monitor.cardiology also following





* .








Review of Systems





- Review of Systems


Constitutional: negative: fever, chills, sweats, weakness, malaise, other


ENT: negative: Ear Pain, Ear Discharge, Nose Pain, Nose Discharge, Nose 

Congestion, Mouth Pain, Mouth Swelling, Throat Pain, Throat Swelling, Other


Respiratory: SOB with Excertion.  negative: Cough, Dry, Shortness of Breath, 

Hemoptysis, Pleuritic Pain, Sputum, Wheezing


Cardiovascular: chest pain.  negative: palpitations, orthopnea, paroxysmal 

nocturnal dyspnea, edema, light headedness, other


Gastrointestinal: negative: Nausea, Vomiting, Abdominal Pain, Diarrhea, 

Constipation, Melena, Hematochezia, Other


Genitourinary: negative: Dysuria, Frequency, Incontinence, Hematuria, Retention

, Other


Neurological: negative: Weakness, Numbness, Incoordination, Change in Speech, 

Confusion, Seizures, Other





- Medications/Allergies


Allergies/Adverse Reactions: 


 Allergies











Allergy/AdvReac Type Severity Reaction Status Date / Time


 


No Known Drug Allergies Allergy Unknown  Verified 06/04/18 15:32











Medications: 


 Current Medications





Acetaminophen (Tylenol)  650 mg PO Q4H PRN


   PRN Reason: Headache/Fever/Mild Pain (1-3)


Albuterol/Ipratropium (Duoneb)  3 ml NEB [id number] PRN


   PRN Reason: SOB &/or Wheezing


   Last Admin: 01/12/19 11:09 Dose:  3 ml


Aspirin (Aspirin Chewable)  81 mg PO QAM-[facility]


   Last Admin: 01/12/19 09:40 Dose:  81 mg


Atorvastatin Calcium (Lipitor)  40 mg PO HS [facility]


   Last Admin: 01/11/19 21:04 Dose:  40 mg


Benzonatate (Tessalon)  100 mg PO Q6H PRN


   PRN Reason: Cough


Bisacodyl (Dulcolax)  10 mg PO DAILYPRN PRN


   PRN Reason: Constipation


   Last Admin: 01/12/19 09:41 Dose:  10 mg


Carvedilol (Coreg)  3.125 mg PO BID-[facility]


   Last Admin: 01/12/19 09:39 Dose:  3.125 mg


Clonidine (Catapres)  0.1 mg PO Q4H PRN


   PRN Reason: SBP > 160____


   Last Admin: 01/09/19 22:46 Dose:  0.1 mg


Clonidine (Catapres)  0.2 mg PO BID [facility]


   Last Admin: 01/12/19 09:40 Dose:  0.2 mg


Clopidogrel Bisulfate (Plavix)  75 mg PO DAILY [facility]


   Last Admin: 01/12/19 09:41 Dose:  75 mg


Dextrose/Water (Dextrose 50%)  25 gm SLOW IVP PRN PRN


   PRN Reason: Hypoglycemia


Enoxaparin Sodium (Lovenox)  60 mg SC 0900,2100 [facility]


   Last Admin: 01/12/19 09:42 Dose:  60 mg


Famotidine (Pepcid)  20 mg PO BID [facility]


   Last Admin: 01/12/19 09:40 Dose:  20 mg


Glucagon (Glucagon)  1 mg IM PRN PRN


   PRN Reason: Hypoglycemia


Hydralazine HCl (Apresoline)  10 mg SLOW IVP Q4H PRN


   PRN Reason: SBP > 180 and HR < 70


Dextrose/Water (D5w)  1,000 mls @ 0 mls/hr IV .Q0M PRN


   PRN Reason: Hypoglycemia


Insulin Human Lispro (Humalog)  0 units SC .MODERATE SLIDING SC PRN


   PRN Reason: Moderate Correctional Scale


   Last Admin: 01/12/19 09:41 Dose:  2 units


Insulin Human Lispro (Humalog)  0 units SC .BEDTIME SLIDING SC PRN


   PRN Reason: Bedtime Correctional Scale


   Last Admin: 01/11/19 21:05 Dose:  2 unit


Isosorbide Mononitrate (Imdur)  60 mg PO DAILY [facility]


   Last Admin: 01/12/19 09:40 Dose:  60 mg


Mometasone Furoate/Formoterol Fumar (Dulera 100 Mcg/5 Mcg Inhaler)  1 puff INH 

BID-RT [facility]


   Last Admin: 01/12/19 07:10 Dose:  1 puff


Nitroglycerin (Nitrostat)  0.4 mg SL Q5MIN PRN


   PRN Reason: Chest Pain


   Last Admin: 01/12/19 08:56 Dose:  0.4 mg


Nitroglycerin (Nitro-Bid 2% Ointment)  0.5 inch TOP Q8HR [facility]


   Last Admin: 01/12/19 06:48 Dose:  0.5 inch


Ondansetron HCl (Zofran)  4 mg IVP Q6H PRN


   PRN Reason: Nausea/Vomiting


Senna/Docusate Sodium (Senokot S)  2 tab PO BID PRN


   PRN Reason: Constipation


Sodium Chloride (Flush - Normal Saline)  10 ml IVF Q12HR [facility]


   Last Admin: 01/12/19 09:41 Dose:  10 ml


Sodium Chloride (Flush - Normal Saline)  10 ml IVF PRN PRN


   PRN Reason: Saline Flush


Valsartan (Diovan)  160 mg PO BID [facility]


   Last Admin: 01/12/19 09:39 Dose:  160 mg English

## 2019-01-12 NOTE — EKG
Test Reason : CP

Blood Pressure : ***/*** mmHG

Vent. Rate : 062 BPM     Atrial Rate : 062 BPM

   P-R Int : 128 ms          QRS Dur : 090 ms

    QT Int : 422 ms       P-R-T Axes : 056 043 097 degrees

   QTc Int : 428 ms

 

Sinus rhythm with occasional Premature ventricular complexes

Possible Left atrial enlargement

Nonspecific ST and T wave abnormality

Abnormal ECG

 

Confirmed by JACOB BURGESS, ARMANDO (12),  MAN BRYSON (16) on 1/12/2019 10:59:19 PM

 

Referred By:             Confirmed By:ARMANDO JAMES MD

## 2019-01-13 NOTE — PDOC.PN
- Subjective


Encounter Start Date: 01/13/19


Encounter Start Time: 13:02


Subjective: feels weak and scared.no more chest pain since yesterday,no SOB





- Objective


MAR Reviewed: Yes


Vital Signs & Weight: 


 Vital Signs (12 hours)











  Temp Pulse Resp BP Pulse Ox


 


 01/13/19 11:14  98.2 F  68  18  108/58 L  97


 


 01/13/19 10:25   64  16   98


 


 01/13/19 07:52  97.9 F  65  18  142/67 H  99


 


 01/13/19 07:16   66  16   96


 


 01/13/19 07:13   66  16  


 


 01/13/19 04:00  98.2 F  70  18  144/66 H  97


 


 01/13/19 03:45      96








 Weight











Weight                         166 lb














I&O: 


 











 01/12/19 01/13/19 01/14/19





 06:59 06:59 06:59


 


Intake Total 1440 1170 


 


Output Total 500 1225 


 


Balance 940 -55 











Result Diagrams: 


 01/13/19 05:27





 01/13/19 05:27


Additional Labs: 


 Accuchecks











  01/13/19 01/13/19 01/12/19





  10:56 05:31 20:23


 


POC Glucose  154 H  145 H  196 H














  01/12/19





  16:47


 


POC Glucose  168 H














Phys Exam





- Physical Examination


Constitutional: NAD


HEENT: PERRLA, moist MMs, sclera anicteric, oral pharynx no lesions


Neck: no nodes, no JVD, supple, full ROM


Respiratory: no wheezing, no rales, no rhonchi, clear to auscultation bilateral


Cardiovascular: RRR, no significant murmur


Gastrointestinal: soft, non-tender, no distention, positive bowel sounds


Musculoskeletal: no edema, pulses present


Neurological: non-focal, normal sensation, moves all 4 limbs


Psychiatric: normal affect, A&O x 3


Skin: no rash





Dx/Plan


(1) NSTEMI (non-ST elevated myocardial infarction)


Code(s): I21.4 - NON-ST ELEVATION (NSTEMI) MYOCARDIAL INFARCTION   Status: 

Acute   Comment: Lovenox BID.Started on ASA,statin,BB,Imdur,ARB,Plavix   





(2) COPD (chronic obstructive pulmonary disease)


Status: Acute   





(3) Coronary artery disease


Code(s): I25.10 - ATHSCL HEART DISEASE OF NATIVE CORONARY ARTERY W/O ANG PCTRS 

  Status: Acute   Comment: inoperable disease.Medical management   





(4) Diabetes mellitus type 2 in nonobese


Code(s): E11.9 - TYPE 2 DIABETES MELLITUS WITHOUT COMPLICATIONS   Status: Acute

   





(5) Hypertension


Code(s): I10 - ESSENTIAL (PRIMARY) HYPERTENSION   Status: Acute   





(6) Tobacco abuse


Code(s): Z72.0 - TOBACCO USE   Status: Acute   





- Plan


DVT proph w/SCDs


cont medical management.inoperable cardiac disease.


-: optimize symptoms and likley DC harish ein am


-: cardiology recs





* .








Review of Systems





- Review of Systems


Constitutional: weakness, malaise.  negative: fever, chills, sweats, other


ENT: negative: Ear Pain, Ear Discharge, Nose Pain, Nose Discharge, Nose 

Congestion, Mouth Pain, Mouth Swelling, Throat Pain, Throat Swelling, Other


Respiratory: negative: Cough, Dry, Shortness of Breath, Hemoptysis, SOB with 

Excertion, Pleuritic Pain, Sputum, Wheezing


Cardiovascular: negative: chest pain, palpitations, orthopnea, paroxysmal 

nocturnal dyspnea, edema, light headedness, other


Gastrointestinal: negative: Nausea, Vomiting, Abdominal Pain, Diarrhea, 

Constipation, Melena, Hematochezia, Other


Genitourinary: negative: Dysuria, Frequency, Incontinence, Hematuria, Retention

, Other


Musculoskeletal: negative: Neck Pain, Shoulder Pain, Arm Pain, Back Pain, Hand 

Pain, Leg Pain, Foot Pain, Other


Neurological: negative: Weakness, Numbness, Incoordination, Change in Speech, 

Confusion, Seizures, Other





- Medications/Allergies


Allergies/Adverse Reactions: 


 Allergies











Allergy/AdvReac Type Severity Reaction Status Date / Time


 


No Known Drug Allergies Allergy Unknown  Verified 06/04/18 15:32











Medications: 


 Current Medications





Acetaminophen (Tylenol)  650 mg PO Q4H PRN


   PRN Reason: Headache/Fever/Mild Pain (1-3)


Albuterol/Ipratropium (Duoneb)  3 ml NEB [id number] [facility]


   Last Admin: 01/13/19 10:25 Dose:  3 ml


Alprazolam (Xanax)  0.25 mg PO TIDPRN PRN


   PRN Reason: Anxiety


   Last Admin: 01/13/19 08:23 Dose:  0.25 mg


Aspirin (Aspirin Chewable)  81 mg PO QAM-WM [facility]


   Last Admin: 01/13/19 08:24 Dose:  81 mg


Atorvastatin Calcium (Lipitor)  40 mg PO HS [facility]


   Last Admin: 01/12/19 20:14 Dose:  40 mg


Benzonatate (Tessalon)  100 mg PO Q6H PRN


   PRN Reason: Cough


Bisacodyl (Dulcolax)  10 mg PO DAILYPRN PRN


   PRN Reason: Constipation


   Last Admin: 01/12/19 09:41 Dose:  10 mg


Carvedilol (Coreg)  3.125 mg PO BID-[facility]


   Last Admin: 01/13/19 08:24 Dose:  3.125 mg


Clonidine (Catapres)  0.1 mg PO Q4H PRN


   PRN Reason: SBP > 160____


   Last Admin: 01/09/19 22:46 Dose:  0.1 mg


Clonidine (Catapres)  0.2 mg PO BID [facility]


   Last Admin: 01/13/19 08:24 Dose:  0.2 mg


Clopidogrel Bisulfate (Plavix)  75 mg PO DAILY [facility]


   Last Admin: 01/13/19 08:24 Dose:  75 mg


Dextrose/Water (Dextrose 50%)  25 gm SLOW IVP PRN PRN


   PRN Reason: Hypoglycemia


Enoxaparin Sodium (Lovenox)  60 mg SC 0900,2100 [facility]


   Last Admin: 01/13/19 08:25 Dose:  60 mg


Famotidine (Pepcid)  20 mg PO BID [facility]


   Last Admin: 01/13/19 08:25 Dose:  20 mg


Glucagon (Glucagon)  1 mg IM PRN PRN


   PRN Reason: Hypoglycemia


Hydralazine HCl (Apresoline)  10 mg SLOW IVP Q4H PRN


   PRN Reason: SBP > 180 and HR < 70


Dextrose/Water (D5w)  1,000 mls @ 0 mls/hr IV .Q0M PRN


   PRN Reason: Hypoglycemia


Insulin Human Lispro (Humalog)  0 units SC .MODERATE SLIDING SC PRN


   PRN Reason: Moderate Correctional Scale


   Last Admin: 01/12/19 09:41 Dose:  2 units


Insulin Human Lispro (Humalog)  0 units SC .BEDTIME SLIDING SC PRN


   PRN Reason: Bedtime Correctional Scale


   Last Admin: 01/11/19 21:05 Dose:  2 unit


Isosorbide Mononitrate (Imdur)  60 mg PO DAILY [facility]


   Last Admin: 01/13/19 08:24 Dose:  60 mg


Mometasone Furoate/Formoterol Fumar (Dulera 100 Mcg/5 Mcg Inhaler)  1 puff INH 

BID-RT [facility]


   Last Admin: 01/13/19 07:13 Dose:  1 puff


Nitroglycerin (Nitrostat)  0.4 mg SL Q5MIN PRN


   PRN Reason: Chest Pain


   Last Admin: 01/13/19 06:40 Dose:  0.4 mg


Nitroglycerin (Nitro-Bid 2% Ointment)  0.5 inch TOP Q8HR [facility]


   Last Admin: 01/13/19 05:37 Dose:  0.5 inch


Ondansetron HCl (Zofran)  4 mg IVP Q6H PRN


   PRN Reason: Nausea/Vomiting


Senna/Docusate Sodium (Senokot S)  2 tab PO BID PRN


   PRN Reason: Constipation


   Last Admin: 01/12/19 22:08 Dose:  2 tab


Sodium Chloride (Flush - Normal Saline)  10 ml IVF Q12HR [facility]


   Last Admin: 01/13/19 08:23 Dose:  10 ml


Sodium Chloride (Flush - Normal Saline)  10 ml IVF PRN PRN


   PRN Reason: Saline Flush


Valsartan (Diovan)  160 mg PO BID [facility]


   Last Admin: 01/13/19 08:24 Dose:  160 mg

## 2019-01-14 NOTE — PDOC.PN
- Subjective


Encounter Start Date: 01/14/19


Encounter Start Time: 08:20





Pt seen for followup re: unstable angina.  Had chest pain earlier.





- Objective


MAR Reviewed: Yes


Vital Signs & Weight: 


 Vital Signs (12 hours)











  Temp Pulse Resp BP Pulse Ox


 


 01/14/19 14:07   70  12  


 


 01/14/19 11:56  97.6 F  67  18  119/57 L  97


 


 01/14/19 11:09   70  14  


 


 01/14/19 07:35  97.8 F  72  20  156/72 H  100


 


 01/14/19 07:12   80  14  


 


 01/14/19 04:00      97


 


 01/14/19 03:45   79  18  124/70  100








 Weight











Weight                         168 lb 9.6 oz














I&O: 


 











 01/13/19 01/14/19 01/15/19





 06:59 06:59 06:59


 


Intake Total 1170 1090 


 


Output Total 1225 700 


 


Balance -55 390 











Result Diagrams: 


 01/13/19 05:27





 01/13/19 05:27


Additional Labs: 


 Accuchecks











  01/14/19 01/14/19 01/13/19





  11:17 05:35 20:22


 


POC Glucose  202 H  165 H  211 H














  01/13/19





  16:47


 


POC Glucose  188 H











EKG Reviewed by me: Yes (Tele:  NSR)





Phys Exam





- Physical Examination


Constitutional: NAD


HEENT: moist MMs


Neck: supple


Respiratory: clear to auscultation bilateral


Cardiovascular: RRR


Gastrointestinal: soft


Neurological: moves all 4 limbs


Psychiatric: normal affect





Dx/Plan


(1) Unstable angina


Status: Acute   Comment: continue BID Lovenox, ASA, statin, BB, Imdur, ARB and 

Plavix   





(2) Diabetes mellitus type 2 in nonobese


Code(s): E11.9 - TYPE 2 DIABETES MELLITUS WITHOUT COMPLICATIONS   Status: 

Chronic   Comment: continue accuchecks, insulin sliding scale   





(3) Hypertension


Code(s): I10 - ESSENTIAL (PRIMARY) HYPERTENSION   Status: Chronic   Comment: 

controlled   





- Plan





* .








Review of Systems





- Review of Systems


Respiratory: negative: Cough, Shortness of Breath, SOB with Excertion, 

Pleuritic Pain, Wheezing


Cardiovascular: chest pain.  negative: palpitations, orthopnea, paroxysmal 

nocturnal dyspnea, edema, light headedness





- Medications/Allergies


Allergies/Adverse Reactions: 


 Allergies











Allergy/AdvReac Type Severity Reaction Status Date / Time


 


No Known Drug Allergies Allergy Unknown  Verified 06/04/18 15:32











Medications: 


 Current Medications





Acetaminophen (Tylenol)  650 mg PO Q4H PRN


   PRN Reason: Headache/Fever/Mild Pain (1-3)


Albuterol/Ipratropium (Duoneb)  3 ml NEB [id number] [facility]


   Last Admin: 01/14/19 14:07 Dose:  3 ml


Alprazolam (Xanax)  0.25 mg PO TIDPRN PRN


   PRN Reason: Anxiety


   Last Admin: 01/14/19 01:52 Dose:  0.25 mg


Aspirin (Aspirin Chewable)  81 mg PO QAM-[facility]


   Last Admin: 01/14/19 08:29 Dose:  81 mg


Atorvastatin Calcium (Lipitor)  40 mg PO [facility]


   Last Admin: 01/13/19 21:08 Dose:  40 mg


Benzonatate (Tessalon)  100 mg PO Q6H PRN


   PRN Reason: Cough


Bisacodyl (Dulcolax)  10 mg PO DAILYPRN PRN


   PRN Reason: Constipation


   Last Admin: 01/12/19 09:41 Dose:  10 mg


Carvedilol (Coreg)  3.125 mg PO BID-[facility]


   Last Admin: 01/14/19 08:30 Dose:  3.125 mg


Clonidine (Catapres)  0.1 mg PO Q4H PRN


   PRN Reason: SBP > 160____


   Last Admin: 01/09/19 22:46 Dose:  0.1 mg


Clonidine (Catapres)  0.2 mg PO BID [facility]


   Last Admin: 01/14/19 08:30 Dose:  0.2 mg


Clopidogrel Bisulfate (Plavix)  75 mg PO DAILY [facility]


   Last Admin: 01/14/19 08:30 Dose:  75 mg


Dextrose/Water (Dextrose 50%)  25 gm SLOW IVP PRN PRN


   PRN Reason: Hypoglycemia


Enoxaparin Sodium (Lovenox)  60 mg SC 0900,2100 [facility]


   Last Admin: 01/14/19 08:31 Dose:  60 mg


Famotidine (Pepcid)  20 mg PO BID [facility]


   Last Admin: 01/14/19 08:30 Dose:  20 mg


Glucagon (Glucagon)  1 mg IM PRN PRN


   PRN Reason: Hypoglycemia


Hydralazine HCl (Apresoline)  10 mg SLOW IVP Q4H PRN


   PRN Reason: SBP > 180 and HR < 70


Dextrose/Water (D5w)  1,000 mls @ 0 mls/hr IV .Q0M PRN


   PRN Reason: Hypoglycemia


Insulin Human Lispro (Humalog)  0 units SC .MODERATE SLIDING SC PRN


   PRN Reason: Moderate Correctional Scale


   Last Admin: 01/14/19 12:03 Dose:  4 units


Insulin Human Lispro (Humalog)  0 units SC .BEDTIME SLIDING SC PRN


   PRN Reason: Bedtime Correctional Scale


   Last Admin: 01/11/19 21:05 Dose:  2 unit


Isosorbide Mononitrate (Imdur)  60 mg PO DAILY [facility]


   Last Admin: 01/14/19 08:30 Dose:  60 mg


Mometasone Furoate/Formoterol Fumar (Dulera 100 Mcg/5 Mcg Inhaler)  1 puff INH 

BID-RT [facility]


   Last Admin: 01/14/19 07:12 Dose:  1 puff


Nitroglycerin (Nitrostat)  0.4 mg SL Q5MIN PRN


   PRN Reason: Chest Pain


   Last Admin: 01/14/19 08:26 Dose:  0.4 mg


Nitroglycerin (Nitro-Bid 2% Ointment)  0.5 inch TOP Q8HR [facility]


   Last Admin: 01/14/19 05:52 Dose:  0.5 inch


Ondansetron HCl (Zofran)  4 mg IVP Q6H PRN


   PRN Reason: Nausea/Vomiting


Ranolazine (Ranexa)  500 mg PO BID [facility]


Ranolazine (Ranexa)  500 mg PO 1545 [facility]


   Stop: 01/14/19 17:00


Senna/Docusate Sodium (Senokot S)  2 tab PO BID PRN


   PRN Reason: Constipation


   Last Admin: 01/12/19 22:08 Dose:  2 tab


Sodium Chloride (Flush - Normal Saline)  10 ml IVF Q12HR [facility]


   Last Admin: 01/14/19 08:37 Dose:  10 ml


Sodium Chloride (Flush - Normal Saline)  10 ml IVF PRN PRN


   PRN Reason: Saline Flush


Valsartan (Diovan)  160 mg PO BID [facility]


   Last Admin: 01/14/19 08:30 Dose:  160 mg

## 2019-01-14 NOTE — PQF
CLINICAL DOCUMENTATION IMPROVEMENT CLARIFICATION FORM:  ICD-10 Updated

PLEASE DO AN ADDENDUM TO THE PROGRESS NOTE WITH ANY DOCUMENTATION UPDATES OR 
ADDITIONS AND CARRY THROUGH TO DC SUMMARY.   THANK YOU.



DATE:  1/14/19; 1/15/19                         ATTN: Dr. Mcelroy



Please exercise your independent, professional judgment in responding to the 
clarification form. 

Clinical indicators are provided on the bottom of this form for your review



Please check appropriate box(s):

[  ] Acute exacerbation of COPD 

[  ] COPD  Chronic and stable

[  ] Other diagnosis ___________

[  ] Unable to determine



In addition, please specify:

Present on Admission (POA):  [  ] Yes             [  ] No             [  ] 
Unable to determine



For continuity of documentation, please document condition throughout progress 
notes and discharge summary.  Thank You.



CLINICAL INDICATORS - SIGNS / SYMPTOMS / LABS



H&P 1/9: Chronic obstructive pulmonary disease, currently well controlled.



PN 1/10 - 1/13: COPD. ACUTE



RISKS:

H&P 1/9: Hx of cad, cocaine abuse, COPD, DM.  Smokes 1-2 cigarettes daily



TREATMENT: 

Order 1/12: Duoneb 3 ml NEB Q4 hr

Order 1/19: Resp: O2 to keep sats 95%

___________________________________________________





Thank you,

Cheyenne

(This form is maintained as a part of the permanent medical record)

 2015 "[employer]  All Rights Reserved

Cheyenne Jackson RN, BSN    [email or web address]    Office: [phone]

                                                              

[facility]

## 2019-01-15 NOTE — PDOC.PN
- Subjective


Encounter Start Date: 01/15/19


Encounter Start Time: 08:20





Pt seen for followup re; unstable angina.  No chest pain today.  nausea+, 

vomiting+





- Objective


MAR Reviewed: Yes


Vital Signs & Weight: 


 Vital Signs (12 hours)











  Temp Pulse Resp BP BP Pulse Ox


 


 01/15/19 11:38  97.7 F  62  16   118/60  100


 


 01/15/19 10:50   70  16   


 


 01/15/19 07:48   80  14   


 


 01/15/19 07:30  97.7 F  65  18  158/78 H   98


 


 01/15/19 03:12  98 F  66  18  122/58 L   99


 


 01/15/19 02:09   62  16    97








 Weight











Weight                         168 lb 14.4 oz














I&O: 


 











 01/14/19 01/15/19 01/16/19





 06:59 06:59 06:59


 


Intake Total 1090 1110 


 


Output Total 700 950 


 


Balance 390 160 











Result Diagrams: 


 01/15/19 04:59





 01/15/19 04:59


Additional Labs: 


 Accuchecks











  01/15/19 01/15/19 01/14/19





  11:07 05:32 20:49


 


POC Glucose  102  155 H  216 H














  01/14/19





  17:01


 


POC Glucose  118 H











EKG Reviewed by me: Yes (Tele:  NSR)





Phys Exam





- Physical Examination


Constitutional: NAD


HEENT: moist MMs


Neck: supple


Respiratory: clear to auscultation bilateral


Cardiovascular: RRR


Gastrointestinal: soft


Neurological: moves all 4 limbs


Psychiatric: normal affect





Dx/Plan


(1) Unstable angina


Status: Acute   Comment: will continue Lovenox, aspirin, statin, beta blocker, 

Imdur, ARB and Plavix   





(2) Diabetes mellitus type 2 in nonobese


Code(s): E11.9 - TYPE 2 DIABETES MELLITUS WITHOUT COMPLICATIONS   Status: 

Chronic   Comment: reasonable control   





(3) Hypertension


Code(s): I10 - ESSENTIAL (PRIMARY) HYPERTENSION   Status: Chronic   Comment: 

controlled   





(4) COPD (chronic obstructive pulmonary disease)


Status: Chronic   Comment: stable   





- Plan





* .








Review of Systems





- Review of Systems


Cardiovascular: negative: chest pain, palpitations, orthopnea, paroxysmal 

nocturnal dyspnea, edema, light headedness


Gastrointestinal: Nausea, Vomiting.  negative: Abdominal Pain, Diarrhea, 

Constipation, Melena, Hematochezia





- Medications/Allergies


Allergies/Adverse Reactions: 


 Allergies











Allergy/AdvReac Type Severity Reaction Status Date / Time


 


No Known Drug Allergies Allergy Unknown  Verified 06/04/18 15:32











Medications: 


 Current Medications





Acetaminophen (Tylenol)  650 mg PO Q4H PRN


   PRN Reason: Headache/Fever/Mild Pain (1-3)


   Last Admin: 01/15/19 05:30 Dose:  650 mg


Albuterol/Ipratropium (Duoneb)  3 ml NEB [id number] [facility]


   Last Admin: 01/15/19 10:50 Dose:  3 ml


Alprazolam (Xanax)  0.25 mg PO TIDPRN PRN


   PRN Reason: Anxiety


   Last Admin: 01/15/19 02:33 Dose:  0.25 mg


Aspirin (Aspirin Chewable)  81 mg PO QAM-[facility]


   Last Admin: 01/15/19 08:04 Dose:  81 mg


Atorvastatin Calcium (Lipitor)  40 mg PO HS [facility]


   Last Admin: 01/14/19 20:35 Dose:  40 mg


Benzonatate (Tessalon)  100 mg PO Q6H PRN


   PRN Reason: Cough


Bisacodyl (Dulcolax)  10 mg PO DAILYPRN PRN


   PRN Reason: Constipation


   Last Admin: 01/15/19 08:14 Dose:  10 mg


Carvedilol (Coreg)  12.5 mg PO BID-[facility]


Clonidine (Catapres)  0.1 mg PO Q4H PRN


   PRN Reason: SBP > 160____


   Last Admin: 01/09/19 22:46 Dose:  0.1 mg


Clonidine (Catapres)  0.2 mg PO BID [facility]


   Last Admin: 01/15/19 08:04 Dose:  0.2 mg


Clopidogrel Bisulfate (Plavix)  75 mg PO DAILY [facility]


   Last Admin: 01/15/19 08:05 Dose:  75 mg


Dextrose/Water (Dextrose 50%)  25 gm SLOW IVP PRN PRN


   PRN Reason: Hypoglycemia


Enoxaparin Sodium (Lovenox)  60 mg SC 0900,2100 [facility]


   Last Admin: 01/15/19 08:05 Dose:  60 mg


Famotidine (Pepcid)  20 mg PO BID [facility]


   Last Admin: 01/15/19 08:05 Dose:  20 mg


Glucagon (Glucagon)  1 mg IM PRN PRN


   PRN Reason: Hypoglycemia


Hydralazine HCl (Apresoline)  10 mg SLOW IVP Q4H PRN


   PRN Reason: SBP > 180 and HR < 70


Dextrose/Water (D5w)  1,000 mls @ 0 mls/hr IV .Q0M PRN


   PRN Reason: Hypoglycemia


Insulin Human Lispro (Humalog)  0 units SC .MODERATE SLIDING SC PRN


   PRN Reason: Moderate Correctional Scale


   Last Admin: 01/15/19 08:09 Dose:  2 units


Insulin Human Lispro (Humalog)  0 units SC .BEDTIME SLIDING SC PRN


   PRN Reason: Bedtime Correctional Scale


   Last Admin: 01/11/19 21:05 Dose:  2 unit


Isosorbide Mononitrate (Imdur)  60 mg PO DAILY [facility]


   Last Admin: 01/15/19 08:05 Dose:  60 mg


Mometasone Furoate/Formoterol Fumar (Dulera 100 Mcg/5 Mcg Inhaler)  1 puff INH 

BID-RT [facility]


   Last Admin: 01/15/19 07:48 Dose:  1 puff


Nitroglycerin (Nitrostat)  0.4 mg SL Q5MIN PRN


   PRN Reason: Chest Pain


   Last Admin: 01/15/19 02:33 Dose:  0.4 mg


Ondansetron HCl (Zofran)  4 mg IVP Q6H PRN


   PRN Reason: Nausea/Vomiting


   Last Admin: 01/15/19 10:16 Dose:  4 mg


Ranolazine (Ranexa)  1,000 mg PO BID [facility]


   Last Admin: 01/15/19 08:40 Dose:  Not Given


Senna/Docusate Sodium (Senokot S)  2 tab PO BID PRN


   PRN Reason: Constipation


   Last Admin: 01/12/19 22:08 Dose:  2 tab


Sodium Chloride (Flush - Normal Saline)  10 ml IVF Q12HR [facility]


   Last Admin: 01/15/19 08:06 Dose:  10 ml


Sodium Chloride (Flush - Normal Saline)  10 ml IVF PRN PRN


   PRN Reason: Saline Flush


Valsartan (Diovan)  160 mg PO BID [facility]


   Last Admin: 01/15/19 08:05 Dose:  160 mg

## 2019-01-17 NOTE — DIS
DATE OF ADMISSION:  01/09/2019



DATE OF DISCHARGE:  01/16/2019



PRIMARY CARE PROVIDER:  Joaquin Bermudez MD



DISCHARGE DIAGNOSES:  

1. Unstable angina.

2. Hypokalemia.

3. Tobacco abuse.

4. Chronic obstructive pulmonary disease, chronic and stable.



CONSULTATIONS DURING THIS HOSPITALIZATION:  Cardiology, Odin Stoll MD



CONDITION OF PATIENT ON THE DAY OF DISCHARGE:  Stable. 



I assessed Ms. Villarreal on the day of discharge.  She denies chest pain.  Vital 
signs are stable.  S1 and S2 are heard,

regular.  Lungs are clear to auscultation bilaterally. 



DISCHARGE MEDICATIONS:  

1. Ventolin HFA 2 puffs every 4 hours as needed.

2. Nitroglycerin 0.4 mg sublingually every 5 minutes as needed.

3. Norvasc 10 mg daily.

4. Aspirin 81 mg daily.

5. Lipitor 40 mg at bedtime.

6. Coreg 12.5 mg 2 times a day.

7. Clonidine 0.2 mg 2 times a day.

8. Plavix 75 mg daily.

9. Isosorbide mononitrate 60 mg daily.

10. Metformin 500 mg 2 times a day.

11. Dulera 100/5 mcg inhaler, 1 puff two times a day.

12. Ranexa 1000 mg 2 times a day.

13. Valsartan 160 mg 2 times a day.



HOSPITAL COURSE:  Ms. Villarreal is a pleasant 65-year-old lady, who was admitted to

[facility] on January 9, 2019 for chest pain.  Please 
refer

to Dr. Calderon's history and physical note dated January 9, 2019 for further

details.  She was seen by Cardiology Service.  She continued to have on and off

chest pain and her medications were titrated.  She was also started on Ranexa.  
2D

echocardiogram showed left ventricular ejection fraction of 50% to 55%, normal-
sized

left atrium, normal left ventricular size, impaired relaxation compatible with

diastolic dysfunction, mild-to-moderate mitral regurgitation, and mild tricuspid

regurgitation. 



She continued to progressively improve and is being discharged home in a stable

condition.  She is advised to follow up with her primary care provider in 3 
days and

with cardiologist in 2 to 3 weeks. 



Many thanks for allowing me to participate in your patient's care.  Please feel 
free

to contact me with any questions or concerns. 



DISCHARGE DESTINATION:  Home. 



Total amount of time spent coordinating this discharge:  32 minutes.







Job ID:  [id number]



MTDD

## 2019-01-18 NOTE — EKG
Test Reason : STAT

Blood Pressure : ***/*** mmHG

Vent. Rate : 061 BPM     Atrial Rate : 061 BPM

   P-R Int : 132 ms          QRS Dur : 088 ms

    QT Int : 420 ms       P-R-T Axes : 043 044 256 degrees

   QTc Int : 422 ms

 

*** Suspect unspecified pacemaker failure . The pt. may have an atrial pacemaker or this may be basel
ine artifact.

Normal sinus rhythm

Cannot rule out Inferior infarct , age undetermined



Abnormal ECG

When compared with ECG of 09-JAN-2019 17:47,

Premature ventricular complexes are no longer Present

Minimal criteria for Inferior infarct are now Present

T wave inversion now evident in Inferior leads

Confirmed by ABBEY NATION ([id number]) on 1/18/2019 8:14:37 PM

 

Referred By:  SUPRIYA           Confirmed By:ABBEY NATION

## 2020-02-04 NOTE — RAD
CHEST ONE VIEW:

2/4/20

 

HISTORY: 

Shortness of breath.

 

COMPARISON: 

2/4/20

 

FINDINGS: 

There are sternotomy wires. Atherosclerosis of the aorta. Normal cardiac silhouette. Pulmonary vessel
s and hilum are normal. Costophrenic angles are clear. Chronic lung parenchymal changes. No masses or
 consolidation. Hyperinflation. No pneumothorax. 

 

IMPRESSION: 

1.      Chronic lung parenchymal changes. Hyperinflation. 

2.      Atherosclerosis. 

 

POS: PPP

## 2020-02-04 NOTE — RAD
EXAM:

Chest Two Views



2/4/2020 12:26 PM



HISTORY:

Cough



COMPARISON:

January 9, 2019



FINDINGS:



Lungs: Chronic lung changes are stable



Heart: Stable mild cardiomegaly



Pulmonary vessels: Normal.



Costophrenic angles: Clear.



Pneumothorax: None.



Osseous structures:Intact.



Additional findings:   Stable post-CABG change.



IMPRESSION:

No significant acute intrathoracic disease.



Reported By: Niranjan Ruiz 

Electronically Signed:  2/4/2020 12:26 PM

## 2020-02-04 NOTE — PDOC.HHP
Hospitalist HPI





- History of Present Illness


Shortness of breath


History of Present Illness: 





66-year-old female with complex past medical history presents with shortness of 

breath. Patient with past medical history of COPD who is currently smoking 

three cigarettes per day, coronary artery disease with history of coronary 

artery bypass grafting, CHF with EF 50% in 2019 and diastolic dysfunction, 

cocaine use, hypertension, hyperlipidemia, and osteoarthritis. Patient has been 

diagnosis COPD and yet she continues to smoke. Patient with several weeks of 

worsening shortness of breath that has gotten acutely worsen the past one week. 

Patient with cough with productive green sputum. Patient was subjective fever 

and chills. Patient denies sick contacts and states that she lives alone. 

Patient was initially presenting to [place] and then was transferred to a 

higher level of care. I find the patient in the emergency department she is 

breathing tenuously on the BiPAP. Patient has poor air movement and all lung 

fields and is diffusely wheezing. Patient admitted to the intermediate medical 

care for for close management.





Hospitalist ROS





- Review of Systems


All other systems reviewed; all pertinent +/- noted in HPI/Subj





Hospitalist History





- Past Medical History


Source: patient, old records


Cardiac: reports: CAD, HTN, Hyperlipidemia


Pulmonary: reports: COPD, high cholesterol, hypertension, lung disease


Endocrine: reports: Diabetes





- Past Surgical History


Past Surgical History: reports: CABG





- Family History


Family History: reports: hypertension





- Social History


Smoking Status: Current every day smoker


Tobacco Type: chewing tobacco


Alcohol: reports: None


Drugs: reports: cocaine (past use)


Living Situation: Alone


Domestic Violence: Negative


Activity level: independent ambulation





- Exam


General Appearance: ill appearing


Eye: PERRL, anicteric sclera


ENT: normocephalic atraumatic, moist mucosa


Neck: supple, symmetric, no lymphadenopathy


Heart: no murmur, no gallops, no rubs, normal peripheral pulses


Respiratory: no rales, normal chest expansion, no tachypnea, rhonchi, wheezes (

severe all lung fields)


Gastrointestinal: soft, non-tender, non-distended, normal bowel sounds, no 

guarding, no rigidity


Extremities: no edema


Skin: no lesions, no rashes


Neurological: cranial nerve grossly intact, no focal deficits


Musculoskeletal: generalized weakness


Psychiatric: normal affect, normal behavior, A&O x 3





Hospitalist Results





- Labs


Lab results: 


 











ABG pH  7.34  (7.35-7.45)  L  02/04/20  14:47    


 


ABG pCO2  34.7 mmHg (35.0-45.0)  L  02/04/20  14:47    


 


ABG pO2  106.0 mmHg (> 80.0)  H  02/04/20  14:47    














Hospitalist H&P A/P





- Problem


(1) COPD exacerbation


Code(s): J44.1 - CHRONIC OBSTRUCTIVE PULMONARY DISEASE W (ACUTE) EXACERBATION   

Status: Acute   





(2) Acute respiratory failure


Code(s): J96.00 - ACUTE RESPIRATORY FAILURE, UNSP W HYPOXIA OR HYPERCAPNIA   

Status: Acute   





(3) Coronary artery disease


Code(s): I25.10 - ATHSCL HEART DISEASE OF NATIVE CORONARY ARTERY W/O ANG PCTRS 

  Status: Acute   





(4) Unstable angina


Status: Acute   





(5) Diabetes mellitus type 2 in nonobese


Code(s): E11.9 - TYPE 2 DIABETES MELLITUS WITHOUT COMPLICATIONS   Status: 

Chronic   





(6) Hypertension


Code(s): I10 - ESSENTIAL (PRIMARY) HYPERTENSION   Status: Chronic   





(7) Tobacco abuse


Code(s): Z72.0 - TOBACCO USE   Status: Chronic   





- Plan


Plan: 





Plan:


Admit to intermediate medical care floor


continuous BiPAP


pulmonary specific antibiotics


IV steroids


breathing treatment scheduled and as needed


consider pulmonology consult if patient's breathing does not improve


echocardiogram to monitor congestive heart failure


previous echocardiogram from 2019 with ejection fraction of 50% with diastolic 

dysfunction


does not appear to be in acute CHF exacerbation on admission


Add BNP


AM labs


blood pressure control


short acting insulin for glucose control


continue other home medications as able


G.I. prophylaxis


DVT prophylaxis





disposition: if the patient does not stop smoking long-term prognosis is 

guarded.

## 2020-02-05 NOTE — CON
DATE OF CONSULTATION:  



HISTORY OF PRESENT ILLNESS:  The patient is a 66-year-old woman, who presented 
with increasing dyspnea.  The patient has a long history of a coronary artery 
disease. She previously underwent a cardiac catheterization in 2013.  She 
subsequently underwent coronary artery bypass graft surgery x3.  In , she 
had a LIMA placed

to the LAD and a saphenous vein graft placed to the diagonal and an obtuse 
marginal branch.  The patient had small diffusely diseased vessels.  The 
patient was

readmitted with unstable angina in 2018.  She underwent a repeat 
catheterization.  She was found to have a 99% left main lesion, 100% proximal 
LAD

lesion, 100% circumflex lesion.  The right coronary artery had a chronic 
occlusion. The vein graft to the 1st obtuse marginal branch was patent.  There 
was collateral

flow from the obtuse marginal of the PDA.  The LIMA was not imaged.  The 
patient was advised to discontinue smoking.  She presents with increasing 
dyspnea, fevers, and

chills.  She reported having developing chest discomfort while here in the 
hospital  which responded to one nitroglycerin tablet.  The patient reports

that she has chronic angina and uses occasional nitroglycerin tablets. 



PAST MEDICAL HISTORY: 

1. Coronary artery disease.

2. Hypertension.

3. Dyslipidemia.

4. Diabetes mellitus.

5. GE reflux.

6. Peripheral vascular disease with a history of an occluded aorta.



PAST SURGICAL HISTORY:  Coronary artery bypass surgery, thyroidectomy, 
cholecystectomy, and . 



FAMILY HISTORY:  Positive family history of heart disease.



SOCIAL HISTORY:  Long history of tobacco abuse.  The patient continues to abuse

cocaine. 



ALLERGIES:  NO KNOWN DRUG ALLERGIES.



REVIEW OF SYSTEMS:  Ten-point system otherwise unremarkable.



MEDICATIONS:  See nursing list.



PHYSICAL EXAMINATION:

GENERAL:  Ill-appearing woman, in mild distress. 

VITAL SIGNS:  Blood pressure 121/64. 

NECK:  No jugular venous distention. 

LUNGS:  Bilateral wheezes. 

HEART:  Regular rate and rhythm.  Normal S1 and S2. 

ABDOMEN:  Nondistended. 

EXTREMITIES:  Showed no edema. 

VASCULAR:  Radial pulses are 2+.



LABORATORY RESULTS:  Sodium 136, potassium 5.0, chloride 110, bicarbonate 20, 
BUN

18, creatinine 0.82, and glucose is 408.  Troponin 0.035.  Her white blood cell

count is 2.0, hemoglobin 13.8, hematocrit 44.6, and platelets are 171.  Her EKG

revealed her to have normal sinus rhythm with a nonspecific ST-T wave 
abnormality

with a nonspecific ST-T wave abnormality. 



IMPRESSION AND PLAN:  

1. Chronic obstructive pulmonary disease exacerbation.

2. Stable angina.

3. History of coronary artery bypass graft surgery x3.

4. Diabetes mellitus.

5. Hypertension. 

6. History of occluded aorta.

7. Tobacco abuse.

8. Cocaine abuse. 



This patient presents with a COPD exacerbation,and  possible bronchitis.  The 
patient is on a high dose of Coreg.  We would recommend decreasing the dose of 
this medication.   The patient is on IV antibiotics.  The patient chest 
discomfort resolved one nitroglycerin tablet with a markedly elevated blood 
pressure.  The patient is being

started on nitroglycerin paste.  We will follow this patient with you through 
her hospitalization. 







Job ID:  [id number]



MTDD

## 2020-02-05 NOTE — CON
DATE OF CONSULTATION:  02/05/2020



CONSULTING PHYSICIAN:  [facility].



REASON FOR CONSULTATION:  Shortness of breath.



HISTORY OF PRESENT ILLNESS:  This patient is a pleasant 66-year-old, who was brought

into the hospital last night with increasing shortness of breath.  She was having

some subjective fever and chills and coughing more than usual.  She was diagnosed

with a COPD exacerbation and was admitted for further therapy. 



PAST MEDICAL HISTORY:  

1. Chronic obstructive pulmonary disease.

2. Tobacco abuse.

3. Hyperlipidemia.

4. Hypertension.

5. Coronary artery disease.

6. Diabetes mellitus.



PAST SURGICAL HISTORY:  Coronary artery bypass grafting surgery.



FAMILY MEDICAL HISTORY:  Remarkable for hypertension.



SOCIAL HISTORY:  Smokes a pack a day.  Chews tobacco.  Does not consume alcohol.

Does has a remote history of cocaine use. 



MEDICATIONS:  Prior to admission:

1. Coreg 12.5 mg b.i.d.

2. Atorvastatin 40 mg nightly.

3. Aspirin 81 mg daily.

4. Amlodipine 10 mg daily.

5. Ranexa 1000 mg b.i.d.

6. Nitrostat 0.4 mg sublingual q.5 minutes p.r.n.

7. Imdur 60 mg daily.

8. Plavix 75 mg daily.

9. Metformin 500 mg b.i.d.

10. Clonidine 0.2 mg b.i.d.

11. Ventolin 2 puffs every 4 hours as needed.

12. Valsartan 160 mg b.i.d.

13. Dulera 100/5 two puffs twice daily.

14. The patient received pneumococcal vaccine in 2016.



REVIEW OF SYSTEMS:  Has had some subjective fever and chills.  No nausea, vomiting,

hematemesis, melena, hematochezia, hematuria, or dysuria. 



PHYSICAL EXAMINATION:

VITAL SIGNS:  Temperature 97.1, pulse 61, blood pressure 91/54, and O2 saturation

94%. 

GENERAL:  She is awake, alert, and in no acute distress. 

HEENT:  Pupils reactive to light.  Sclerae are anicteric.  Oropharynx clear. 

NECK:  No adenopathy or JVD. 

LUNGS:  Few expiratory wheezes bilaterally.  Some crackles in the bases. 

CARDIAC:  S1 and S2.  Regular. 

ABDOMEN:  Soft. 

EXTREMITIES:  No edema.



LABORATORY DATA:  ABG; pH of 7.37, pCO2 of 31, and pO2 of 70 that is on 2 L nasal

cannula.  Sodium 136, potassium 5, chloride 110, CO2 of 20, BUN 18, creatinine 0.8,

and glucose 358.  Troponin 0.035.  White blood count 2, hematocrit 44.9, and

platelet count 171.  A chest x-ray demonstrates increased interstitial markings.  No

overt mass, effusion, or infiltrate.  Echocardiogram, EF 45% to 50% with moderate

mitral regurgitation. 



ASSESSMENT:  

1. Chronic obstructive pulmonary disease with exacerbation.

2. Systolic heart failure with some degree of mitral regurgitation.

3. History of hypertension.

4. Tobacco abuse.



PLAN:  I reviewed the orders and agree with current management of antibiotics,

nebulization treatments, IV steroids, inhalers.  We will follow with you. 







Job ID:  [id number]

## 2020-02-05 NOTE — PRG
DATE OF SERVICE:  02/05/2020



SUBJECTIVE:  The patient was seen and examined at the bedside.  She is complaining

about not having bowels for a couple of days.  Usually she has two bowel movements a

day.  She has some abdominal discomfort secondary to that.  She is still short of

breath.  She is off BiPAP. 



OBJECTIVE:  VITAL SIGNS:  Blood pressure is 121/64, pulse is 96, respiratory rate is

22, temperature is 98.4. 

HEENT:  Her head is atraumatic and normocephalic.  Eyes are PERRLA.   Sclerae are

nonicteric.  Oral mucosa is slightly dry. 

NECK:  Supple. 

LUNGS:  Bilateral wheezing present on mid and lower parts of both lungs. 

ABDOMEN:  Soft.  Mildly tender in the epigastric area.  No guarding.  No masses.

EXTREMITIES:  No clubbing,  cyanosis or edema. 

NEUROLOGICAL:  She follows my commands.  She moves all 4 extremities.  There is no

any motor or sensory deficits. 



LABORATORY DATA:  Labs showed white count of 2.0, hemoglobin of 13.8, hematocrit

44.9, platelet count is 171,000.  Sodium of 136, potassium 5.0, chloride 110, CO2 of

20, BUN 18, creatinine 0.82.  Accu-Cheks is ranging from 304 to 408.  Troponin I

0.023.  Third generation TSH 0.1799.  Chest x-ray done last night showed

atherosclerosis and chronic lung parenchymal changes with hyperinflation. 



IMPRESSION:  

1. Acute exacerbation of chronic obstructive pulmonary disease.

2. Acute chest pain.

3. Diabetes mellitus worsening secondary to IV steroid use.

4. Hypertension.

5. Tobacco abuse.

6. Acute respiratory failure requiring CPAP.

7. Coronary artery disease.



PLAN:  Cardiology consult.  Pulmonary consult.  EKG showed some ST-segment

depression with chest pain she had yesterday.  She is on aspirin.  We will add

nitroglycerin.  We will do echo and get Cardiology consult.  We will start her on 20

units of insulin Lantus along with Accu-Cheks a.c. and at bedtime and sliding scale

to control her glycemia better.  We will continue her antibiotics, DuoNebs and O2. 







Job ID:  [id number]

## 2020-02-06 NOTE — PRG
DATE OF SERVICE:  02/06/2020



SUBJECTIVE:  She feels a little better compared to yesterday.  She still has a lot

of congestion. 



OBJECTIVE:  VITAL SIGNS:  On exam, temperature is 98.0, pulse 59, blood pressure

100/55, and O2 saturation 95%. 

HEENT:  Unremarkable. 

NECK:  No adenopathy or JVD. 

LUNGS:  Coarse breath sounds bilaterally. 

CARDIAC:  S1 and S2.  Regular. 

ABDOMEN:  Soft. 

EXTREMITIES:  No edema.



LABORATORY DATA:  No new lab testing was done today.



ASSESSMENT:  

1. Chronic obstructive pulmonary disease.

2. Likely some degree of bronchopneumonia.

3. Systolic heart failure with mitral regurgitation.

4. Hypertension.

5. Tobacco abuse.



PLAN:  

1. I will change her nebs to EzPAP.

2. Continue current antibiotics.

3. We would leave in [facility] for one more day.







Job ID:  [id number]

## 2020-02-06 NOTE — PRG
DATE OF SERVICE:  02/06/2020



SUBJECTIVE:  Ms. Villarreal has been having intermittent severe chest pain. 



She is currently pain free.  She also has intermittent bradycardia.



OBJECTIVE:  VITAL SIGNS:  Her blood pressure is 130/68, pulse 60. 

LUNGS:  Diffuse wheezing, expiratory. 

CARDIAC:  Normal S1, normal S2. 

ABDOMEN:  Soft and nontender. 

EXTREMITIES:  There is no edema.



ASSESSMENT:  

1. Previous bypass with diffuse distal disease.  Dr. Baer did a cardiac

catheterization on the patient in 2018.  No percutaneous intervention feasible. 

2. Chronic obstructive pulmonary disease.

3. Substance abuse, positive for PCP and cocaine.

4. Intermittent bradycardia.



PLAN:  

1. Stop Coreg.

2. Reduce clonidine.

3. P.r.n. atropine.

4. Nitrates.

5. Lovenox. 

Long-term prognosis is poor.







Job ID:  [id number]

## 2020-02-07 NOTE — PRG
DATE OF SERVICE:  02/07/2020



SUBJECTIVE:  The patient is eating breakfast.  She looks about the same as she has

looked. 



OBJECTIVE:  VITAL SIGNS:  On exam, temperature of 96.8, pulse 73, blood pressure

155/74, and O2 saturation 95%. 

HEENT:  Unremarkable. 

NECK:  She has audible expiratory noises coming from tracheal region. 

LUNGS:  Very diminished wheezing. 

CARDIAC:  S1 and S2.  Regular. 

ABDOMEN:  Soft. 

EXTREMITIES:  No edema.



LABORATORY DATA:  White blood cell count 7.3, hematocrit 39.7, and platelet count

145.  Sodium 136, potassium 4.9, chloride 106, CO2 of 21, BUN 14, creatinine 0.7,

and glucose 307. 



ASSESSMENT:  

1. Chronic obstructive pulmonary disease with exacerbation.

2. Chronic hypoxic and hypercapnic respiratory failure.

3. Likely some degree of bronchial pneumonia.

4. Systolic heart failure with mitral regurgitation.



PLAN:  

1. Continue nebs with EzPAP.

2. Continue antibiotics.

3. We would leave in [facility] until respiratory status is better.







Job ID:  [id number]

## 2020-02-07 NOTE — PRG
DATE OF SERVICE:  02/07/2020



SUBJECTIVE:  Ms. Villarreal is having severe wheezing this morning.  She said this is

common for her in the mornings. 



She is not having chest pain.



OBJECTIVE:  VITAL SIGNS:  Her blood pressure is 155/74, pulse is 90. 

LUNGS:  Severe diffuse expiratory wheezing with increased respiratory rate of 30.

CARDIAC:  Normal S1, normal S2. 

ABDOMEN:  Soft, nontender. 

EXTREMITIES:  No edema.



ASSESSMENT:  

1. Severe diffuse atherosclerotic heart disease, has had repeat cardiac

catheterization showing distal disease.  No other intervention feasible. 

2. Chronic obstructive pulmonary disease with reactive airway disease.

3. History of smoking.  She has not smoked obviously since she has been in the

hospital. 

4. Substance abuse.



PLAN:  

1. She is on steroids.

2. She is on inhaled bronchodilators.

3. She is on aspirin and enoxaparin in view of the unstable angina symptoms.

4. Do a BNP.  If it is high, consideration for some Lasix.  In fact, in the

meantime, go ahead and give her a dose of 20 of Lasix IV in case there is any

evidence of diastolic heart failure. 







Job ID:  [id number]

## 2020-02-07 NOTE — PDOC.HOSPP
- Subjective


Encounter Date: 02/07/20


Encounter Time: 07:00


Subjective: 





Patient seen and examined for respiratory failure. Had CP last night. Also SOB 

with wheezing. No other overnight events





- Objective


Vital Signs & Weight: 


 Vital Signs (12 hours)











  Temp Pulse BP Pulse Ox


 


 02/07/20 03:00  97.7 F   


 


 02/06/20 23:26    121/64 


 


 02/06/20 23:00  98.2 F   


 


 02/06/20 21:21   83  


 


 02/06/20 20:00     95








 Weight











Admit Weight                   147 lb 11.2 oz


 


Weight                         158 lb 9.6 oz











 Most Recent Monitor Data











Heart Rate from ECG            81


 


NIBP                           155/74


 


NIBP BP-Mean                   101


 


Respiration from ECG           25


 


SpO2                           93














I&O: 


 











 02/06/20 02/07/20 02/08/20





 06:59 06:59 06:59


 


Intake Total 300 1200 


 


Output Total 50 750 


 


Balance 250 450 











Result Diagrams: 


 02/07/20 03:17





 02/07/20 03:17


Additional Labs: 


 Accuchecks











  02/07/20 02/06/20 02/06/20





  06:02 20:47 16:04


 


POC Glucose  224 H  315 H  274 H














  02/06/20





  11:25


 


POC Glucose  371 H











EKG Reviewed by me: Yes (Tele SR)





Hospitalist ROS





- Review of Systems


Constitutional: denies: fever, chills, sweats, weakness, malaise, other


Respiratory: reports: cough, SOB with excertion


Cardiovascular: reports: chest pain


Gastrointestinal: denies: nausea, vomiting, abdominal pain, diarrhea, 

constipation, melena, hematochezia, other





- Medication


Medications: 


Active Medications











Generic Name Dose Route Start Last Admin





  Trade Name Freq  PRN Reason Stop Dose Admin


 


Acetaminophen  650 mg  02/04/20 15:19  02/07/20 02:19





  Tylenol  PO   650 mg





  Q4H PRN   Administration





  Headache/Fever/Mild Pain (1-3)   





     





     





     


 


Albuterol/Ipratropium  3 ml  02/06/20 11:00  02/07/20 02:32





  Duoneb  EZPAP   3 ml





  [id number] MILEY   Administration





     





     





     





     


 


Alprazolam  0.25 mg  02/06/20 09:19  02/06/20 20:50





  Xanax  PO  02/07/20 09:20  0.25 mg





  BIDPRN PRN   Administration





  Anxiety   





     





     





     


 


Amlodipine Besylate  5 mg  02/06/20 21:00  02/06/20 21:21





  Norvasc  PO   Not Given





  BID MILEY   





     





     





     





     


 


Aspirin  81 mg  02/05/20 09:00  02/06/20 08:57





  Ecotrin  PO   81 mg





  DAILY MILEY   Administration





     





     





     





     


 


Atorvastatin Calcium  40 mg  02/04/20 21:00  02/06/20 20:14





  Lipitor  PO   40 mg





  HS MILEY   Administration





     





     





     





     


 


Benzonatate  100 mg  02/04/20 15:24  02/05/20 07:42





  Tessalon  PO   100 mg





  Q4H PRN   Administration





  Cough   





     





     





     


 


Bisacodyl  10 mg  02/04/20 15:19  02/05/20 10:50





  Dulcolax  PO   10 mg





  DAILYPRN PRN   Administration





  Constipation   





     





     





     


 


Calcium Carbonate  1,000 mg  02/04/20 15:19  02/05/20 08:05





  Tums  PO   1,000 mg





  Q4H PRN   Administration





  Heartburn  or Indigestion   





     





     





     


 


Clonidine  0.1 mg  02/06/20 21:00  02/06/20 23:26





  Catapres  PO   Not Given





  BID [facility]   





     





     





     





     


 


Clopidogrel Bisulfate  75 mg  02/05/20 09:00  02/06/20 08:57





  Plavix  PO   75 mg





  DAILY MILEY   Administration





     





     





     





     


 


Enoxaparin Sodium  70 mg  02/06/20 21:00  02/06/20 20:14





  Lovenox  SC   70 mg





  0900,2100 MILEY   Administration





     





     





     





     


 


Famotidine  20 mg  02/04/20 21:00  02/06/20 20:14





  Pepcid  PO   20 mg





  BID MILEY   Administration





     





     





     





     


 


Guaifenesin  600 mg  02/06/20 21:00  02/06/20 20:14





  Mucinex  PO   600 mg





  Q12HR MILEY   Administration





     





     





     





     


 


Azithromycin 500 mg/ Sodium  250 mls @ 250 mls/hr  02/04/20 17:00  02/06/20 16:

24





  Chloride  IVPB   250 mls





  Q24HR MILEY   Administration





     





     





     





     


 


Ceftriaxone Sodium 2 gm/  100 mls @ 200 mls/hr  02/04/20 16:00  02/06/20 16:24





  Sodium Chloride  IVPB   100 mls





  Q24HR MILEY   Administration





     





     





     





     


 


Insulin Glargine 20 units/  0.2 mls @ 0 mls/hr  02/05/20 09:00  02/06/20 08:57





  Miscellaneous Medication  SC   0.2 mls





  QAM MILEY   Administration





     





     





     





     


 


Insulin Human Lispro  0 units  02/04/20 15:27  02/06/20 20:50





  Humalog  SC   4 unit





  .BEDTIME SLIDING SC PRN   Administration





  Bedtime Correctional Scale   





     





     





     


 


Insulin Human Lispro  0 units  02/07/20 06:03  02/07/20 06:08





  Humalog  SC   4 unit





  .MODERATE SLIDING SC PRN   Administration





  Moderate Correctional Scale   





     





     





     


 


Isosorbide Mononitrate  60 mg  02/05/20 09:00  02/06/20 10:36





  Imdur  PO   60 mg





  DAILY MILEY   Administration





     





     





     





     


 


Methylprednisolone Sodium Succinate  40 mg  02/04/20 18:00  02/07/20 06:06





  Solu-Medrol  IVP   40 mg





  Q6HR MILEY   Administration





     





     





     





     


 


Mometasone Furoate/Formoterol Fumar  1 puff  02/04/20 18:30  02/06/20 19:10





  Dulera 100 Mcg/5 Mcg Inhaler  INH   1 puff





  BID-RT MILEY   Administration





     





     





     





     


 


Nitroglycerin  0.4 mg  02/04/20 15:18  02/07/20 02:20





  Nitrostat  SL   0.4 mg





  Q5MIN PRN   Administration





  Chest Pain   





     





     





     


 


Nitroglycerin  1 inch  02/06/20 12:00  02/07/20 06:06





  Nitro-Bid 2% Ointment  TOP   1 inch





  Q6HR MILEY   Administration





     





     





     





     


 


Ondansetron HCl  4 mg  02/04/20 15:19  02/05/20 17:48





  Zofran  IVP   4 mg





  Q6H PRN   Administration





  Nausea/Vomiting   





     





     





     


 


Ranolazine  1,000 mg  02/04/20 21:00  02/06/20 20:14





  Ranexa  PO   1,000 mg





  BID MILEY   Administration





     





     





     





     


 


Sodium Chloride  10 ml  02/06/20 21:00  02/06/20 20:14





  Flush - Normal Saline  IVF   10 ml





  Q12HR MILEY   Administration





     





     





     





     


 


Valsartan  80 mg  02/06/20 21:00  02/06/20 23:27





  Diovan  PO   Not Given





  BID MILEY   





     





     





     





     














- Exam


General Appearance: NAD


Heart: RRR, no gallops


Respiratory: normal chest expansion, rhonchi, wheezes


Gastrointestinal: non-tender, non-distended, normal bowel sounds


Extremities: no cyanosis, no clubbing


Neurological: no new deficit


Psychiatric: normal affect, A&O x 3





Hosp A/P





- Plan


DVT proph w/SCDs





Acute respiratory failure with hypoxemia


Unstable angina


COPD exacerbation


HTN


CAD


Polysubstance abuse


Other issues per previous notes





PLAN:


Cont Nebs Q4h with steroids and Atbx


Cont ASA/Plavix


on full Lovenox


AM labs


Cont Lantus 20 units daily - add 10 units HS - also change sliding scale to 

moderate


Cont current dose of Clonidine/Valsartan/Imdur


Amlodipine on hold


No betablockers due to Cocaine abuse

## 2020-02-07 NOTE — PRG
DATE OF SERVICE:  02/06/2020



SUBJECTIVE:  A  66-year-old female with extensive past medical history, presented to

the hospital 2 days ago with shortness of breath.  She is currently admitted to [facility]

for respiratory failure along with unstable angina.  She continues to have

intermittent chest discomfort.  No fever or chills reported.  She denies any

diaphoresis, nausea, or vomiting. 



REVIEW OF SYSTEMS:  As discussed above. 



Telemetry monitoring by my review showed sinus rhythm.



CURRENT MEDICATIONS:  Reviewed.



PHYSICAL EXAMINATION:

VITAL SIGNS: Temperature 97.6, blood pressure 135/72, heart rate of 69, respirations

of 20, O2 saturation 96%. 

GENERAL: A 66-year-old female, in no apparent distress. 

LUNGS:  Clear to auscultation bilaterally with scattered rhonchi and few rales at

bases.  No accessory muscle use. 

HEART: S1, S2 present.  Regular.  Healed midline scar from previous CABG. 

ABDOMEN: Soft. Bowel sounds present. 

EXTREMITIES: No calf tenderness. 

NEUROLOGIC: Grossly nonfocal. 

PSYCHIATRIC:  Alert, awake, oriented x3.  Normal affect.



LABORATORY FINDINGS:  Urine drug screen is positive for PCP and cocaine. 



Creatinine 0.9 with BUN 17, sodium 137, potassium 4.7. 



Troponin was 0.035. 



TSH was 0.179 with normal free T4. 



Hemoglobin 12.4.



DIAGNOSTIC DATA:  Echocardiogram showed ejection fraction of 45% to 50% with

moderate mitral regurgitation. 



Chest x-ray by my review showed chronic lung changes.



IMPRESSION:  

1. Acute hypoxic respiratory failure secondary to chronic obstructive pulmonary

disease exacerbation. 

2. Unstable angina.

3. Polysubstance abuse. The patient is positive for PCP and cocaine.

4. Sinus pause of 3.3 seconds on 5th February 2019.

5. Moderate mitral regurgitation.

6. Coronary artery disease, status post CABG with last cardiac catheterization in

2018. 

7. Tobacco dependence.

8. Hyperlipidemia.



PLAN:  The patient will be monitored in the [facility].  We will continue long-acting

nitrates.  We will hold beta blockers due to cocaine abuse.  We will reduce

valsartan dose due to episode of hypotension yesterday.  Continue aspirin and

Plavix.  Reduce amlodipine dose to 5 mg daily. 







Job ID:  [id number]

## 2020-02-08 NOTE — PRG
DATE OF SERVICE:  02/08/2020



SUBJECTIVE:  Ashleigh Villarreal is a 66-year-old female, COPD exacerbation, doing

better. 



OBJECTIVE:  VITAL SIGNS:  Pulse 76, respiratory rate is 21, saturations 97% on 2 L,

and blood pressure 173/96. 

CHEST:  Decreased breath sounds.  Prolonged expiration.  No wheezing. 

CARDIAC:  Normal S1 and S2.  No gallops. 

ABDOMEN:  No masses.



LABORATORY DATA:  Unremarkable.



ASSESSMENT:  

1. Chronic obstructive pulmonary disease exacerbation.

2. Bronchitis.



PLAN:  Continue present treatment, aggressive PT, supportive care.  Consider

deescalating antibiotics, switching to p.o. medication. 







Job ID:  [id number]

## 2020-02-08 NOTE — PDOC.CPN
- Subjective


Date: 02/08/20


Time: 11:12


Interval history: 





Feeling better.  Less SOB.





- Objective


Allergies/Adverse Reactions: 


 Allergies











Allergy/AdvReac Type Severity Reaction Status Date / Time


 


No Known Drug Allergies Allergy Unknown  Verified 02/04/20 23:57











Visit Medications: 


 Current Medications





Acetaminophen (Tylenol)  650 mg PO Q4H PRN


   PRN Reason: Headache/Fever/Mild Pain (1-3)


   Last Admin: 02/07/20 02:19 Dose:  650 mg


Hydrocodone Bitart/Acetaminophen (Norco 5/325)  1 tab PO Q4H PRN


   PRN Reason: Moderate Pain (4-6)


Hydrocodone Bitart/Acetaminophen (Norco 7.5/325)  1 tab PO Q4H PRN


   PRN Reason: Severe Pain (7-10)


   Last Admin: 02/08/20 10:12 Dose:  1 tab


Albuterol/Ipratropium (Duoneb)  3 ml NEB [id number] PRN


   PRN Reason: SOB &/or Wheezing


Albuterol/Ipratropium (Duoneb)  3 ml [id number] [id number] [facility]


   Last Admin: 02/08/20 10:43 Dose:  3 ml


Amlodipine Besylate (Norvasc)  5 mg PO BID [facility]


   Last Admin: 02/06/20 21:21 Dose:  Not Given


Aspirin (Ecotrin)  81 mg PO DAILY [facility]


   Last Admin: 02/08/20 09:52 Dose:  81 mg


Atorvastatin Calcium (Lipitor)  40 mg PO HS [facility]


   Last Admin: 02/07/20 21:03 Dose:  40 mg


Benzonatate (Tessalon)  100 mg PO Q4H PRN


   PRN Reason: Cough


   Last Admin: 02/05/20 07:42 Dose:  100 mg


Bisacodyl (Dulcolax)  10 mg PO DAILYPRN PRN


   PRN Reason: Constipation


   Last Admin: 02/05/20 10:50 Dose:  10 mg


Calcium Carbonate (Tums)  1,000 mg PO Q4H PRN


   PRN Reason: Heartburn  or Indigestion


   Last Admin: 02/07/20 23:30 Dose:  1,000 mg


Clonidine (Catapres)  0.1 mg PO BID [facility]


   Last Admin: 02/08/20 09:52 Dose:  0.1 mg


Clopidogrel Bisulfate (Plavix)  75 mg PO DAILY [facility]


   Last Admin: 02/08/20 09:52 Dose:  75 mg


Dextrose/Water (Dextrose 50%)  25 gm SLOW IVP PRN PRN


   PRN Reason: Hypoglycemia


Diphenhydramine HCl (Benadryl)  25 mg PO Q6H PRN


   PRN Reason: Itching & Insomnia


Docusate Sodium (Colace)  100 mg PO BIDPRN PRN


   PRN Reason: Constipation


Enoxaparin Sodium (Lovenox)  70 mg SC 0900,2100 [facility]


   Last Admin: 02/08/20 09:52 Dose:  70 mg


Famotidine (Pepcid)  20 mg PO BID [facility]


   Last Admin: 02/08/20 09:52 Dose:  20 mg


Glucagon (Glucagon)  1 mg IM PRN PRN


   PRN Reason: Hypoglycemia


Guaifenesin (Mucinex)  600 mg PO Q12HR [facility]


   Last Admin: 02/08/20 09:52 Dose:  600 mg


Azithromycin 500 mg/ Sodium (Chloride)  250 mls @ 250 mls/hr IVPB Q24HR [facility]


   Last Admin: 02/07/20 17:18 Dose:  250 mls


Ceftriaxone Sodium 2 gm/ (Sodium Chloride)  100 mls @ 200 mls/hr IVPB Q24HR [facility]


   Last Admin: 02/07/20 17:17 Dose:  100 mls


Dextrose/Water (D5w)  1,000 mls @ 0 mls/hr IV .Q0M PRN


   PRN Reason: Hypoglycemia


Insulin Glargine 20 units/ (Miscellaneous Medication)  0.2 mls @ 0 mls/hr SC 

QAM [facility]


   Last Admin: 02/08/20 09:52 Dose:  0.2 mls


Insulin Glargine 10 units/ (Miscellaneous Medication)  0.1 mls @ 0 mls/hr SC HS 

[facility]


   Last Admin: 02/07/20 21:06 Dose:  0.1 mls


Insulin Human Lispro (Humalog)  0 units SC .BEDTIME SLIDING SC PRN


   PRN Reason: Bedtime Correctional Scale


   Last Admin: 02/06/20 20:50 Dose:  4 unit


Insulin Human Lispro (Humalog)  0 units SC .MODERATE SLIDING SC PRN


   PRN Reason: Moderate Correctional Scale


   Last Admin: 02/08/20 06:35 Dose:  4 unit


Isosorbide Mononitrate (Imdur)  60 mg PO DAILY [facility]


   Last Admin: 02/08/20 09:53 Dose:  60 mg


Labetalol HCl (Normodyne)  10 mg SLOW IVP Q4H PRN


   PRN Reason: SBP Greater Than 180


Loperamide HCl (Imodium)  2 mg PO PRN PRN


   PRN Reason: Diarrhea/Loose Stools


Melatonin (Melatonin)  3 mg PO HS PRN


   PRN Reason: Insomnia


Methylprednisolone Sodium Succinate (Solu-Medrol)  40 mg IVP Q6HR [facility]


   Last Admin: 02/08/20 06:32 Dose:  40 mg


Mometasone Furoate/Formoterol Fumar (Dulera 100 Mcg/5 Mcg Inhaler)  1 puff INH 

BID-RT [facility]


   Last Admin: 02/08/20 07:22 Dose:  1 puff


Nitroglycerin (Nitrostat)  0.4 mg SL Q5MIN PRN


   PRN Reason: Chest Pain


   Last Admin: 02/07/20 02:20 Dose:  0.4 mg


Nitroglycerin (Nitro-Bid 2% Ointment)  1 inch TOP Q6HR [facility]


   Last Admin: 02/08/20 06:32 Dose:  1 inch


Ondansetron HCl (Zofran Odt)  4 mg PO Q6H PRN


   PRN Reason: Nausea/Vomiting


Ondansetron HCl (Zofran)  4 mg IVP Q6H PRN


   PRN Reason: Nausea/Vomiting


   Last Admin: 02/05/20 17:48 Dose:  4 mg


Polyethylene Glycol (Miralax)  17 gm PO DAILY [facility]


   Last Admin: 02/08/20 09:51 Dose:  17 gm


Ranolazine (Ranexa)  1,000 mg PO BID [facility]


   Last Admin: 02/08/20 09:52 Dose:  1,000 mg


Sodium Chloride (Flush - Normal Saline)  10 ml IVF Q12HR [facility]


   Last Admin: 02/08/20 09:53 Dose:  10 ml


Sodium Chloride (Flush - Normal Saline)  10 ml IVF PRN PRN


   PRN Reason: Saline Flush


Throat Lozenges (Cepastat Lozenges)  1 maryanne PO Q2H PRN


   PRN Reason: Sore Throat


Valsartan (Diovan)  80 mg PO BID [facility]


   Last Admin: 02/08/20 09:53 Dose:  80 mg








Vital Signs & Weight: 


 Vital Signs











  Temp Pulse Resp BP Pulse Ox


 


 02/08/20 10:43   76  21 H   96


 


 02/08/20 09:52     161/82 H 


 


 02/08/20 08:00      99


 


 02/08/20 07:39  96.2 F L    


 


 02/08/20 07:24      98


 


 02/08/20 07:21   70  34 H   98


 


 02/08/20 04:00  98.2 F    


 


 02/07/20 23:29  97.2 F L    








 











Admit Weight                   147 lb 11.2 oz


 


Weight                         158 lb 9.6 oz

















- Physical Exam


General: alert & oriented x3


Neck: no masses, no bruit


Cardiac: regular rate


Lungs: wheezes, bibasilar rales


Extremities: no edema





- Labs


Result Diagrams: 


 02/08/20 03:45





 02/08/20 03:45


 Troponin/CKMB











CK-MB (CK-2)  1.1 ng/mL (0-6.6)   02/05/20  08:56    


 


Troponin I  0.022 ng/mL (< 0.028)   02/06/20  16:19    














- Assessment/Plan


Assessment/Plan: 


Diffuse CAD not amenable to intervention


COPD


Tobacco use








BNP elevated


Add one dose lasix


Neb treatments


inoperable CAD


Treat medically


Stop cocaine use

## 2020-02-08 NOTE — PDOC.HOSPP
- Subjective


Encounter Date: 02/08/20


Encounter Time: 16:00


Subjective: 





Patient seen and examined for CP/COPD exacerbation. No CP. SOB improving. No 

new complaints. No overnight events





- Objective


Vital Signs & Weight: 


 Vital Signs (12 hours)











  Temp Pulse Pulse Pulse Resp BP BP


 


 02/08/20 19:04       


 


 02/08/20 19:02       


 


 02/08/20 16:07  97.7 F      


 


 02/08/20 14:55   64    18  


 


 02/08/20 13:55    79  105 H    137/82


 


 02/08/20 11:54  97.4 F L      


 


 02/08/20 10:43   76    21 H  


 


 02/08/20 09:52       161/82 H 


 


 02/08/20 08:00       


 


 02/08/20 07:39  96.2 F L      














  BP Pulse Ox Pulse Ox


 


 02/08/20 19:04   96 


 


 02/08/20 19:02   98 


 


 02/08/20 16:07   


 


 02/08/20 14:55   95 


 


 02/08/20 13:55  135/63   97


 


 02/08/20 11:54   


 


 02/08/20 10:43   96 


 


 02/08/20 09:52   


 


 02/08/20 08:00   99 


 


 02/08/20 07:39   








 Weight











Admit Weight                   147 lb 11.2 oz


 


Weight                         158 lb 9.6 oz











 Most Recent Monitor Data











Heart Rate from ECG            86


 


NIBP                           144/77


 


NIBP BP-Mean                   99


 


Respiration from ECG           20


 


SpO2                           94














I&O: 


 











 02/07/20 02/08/20 02/09/20





 06:59 06:59 06:59


 


Intake Total 1200 820 250


 


Output Total 750 900 800


 


Balance 450 -80 -550











Result Diagrams: 


 02/08/20 03:45





 02/08/20 03:45


Additional Labs: 


 Accuchecks











  02/08/20 02/08/20 02/08/20





  16:37 10:48 05:38


 


POC Glucose  443 H  203 H  252 H














  02/07/20





  20:47


 


POC Glucose  231 H











EKG Reviewed by me: Yes (Tele SR)





Hospitalist ROS





- Review of Systems


Respiratory: reports: SOB with excertion, wheezing.  denies: cough, dry, 

shortness of breath, hemoptysis, pleuritic pain, sputum, other


Cardiovascular: denies: chest pain, palpitations, orthopnea, paroxysmal noc. 

dyspnea, edema, light headedness, other


Gastrointestinal: denies: nausea, vomiting, abdominal pain, diarrhea, 

constipation, melena, hematochezia, other





- Medication


Medications: 


Active Medications











Generic Name Dose Route Start Last Admin





  Trade Name Freq  PRN Reason Stop Dose Admin


 


Acetaminophen  650 mg  02/04/20 15:19  02/07/20 02:19





  Tylenol  PO   650 mg





  Q4H PRN   Administration





  Headache/Fever/Mild Pain (1-3)   





     





     





     


 


Hydrocodone Bitart/Acetaminophen  1 tab  02/04/20 15:19  02/08/20 10:12





  Norco 7.5/325  PO   1 tab





  Q4H PRN   Administration





  Severe Pain (7-10)   





     





     





     


 


Albuterol/Ipratropium  3 ml  02/06/20 11:00  02/08/20 19:04





  Duoneb  EZPAP   3 ml





  [id number] MILEY   Administration





     





     





     





     


 


Amlodipine Besylate  5 mg  02/06/20 21:00  02/06/20 21:21





  Norvasc  PO   Not Given





  BID MILEY   





     





     





     





     


 


Aspirin  81 mg  02/05/20 09:00  02/08/20 09:52





  Ecotrin  PO   81 mg





  DAILY MILEY   Administration





     





     





     





     


 


Atorvastatin Calcium  40 mg  02/04/20 21:00  02/07/20 21:03





  Lipitor  PO   40 mg





  HS MILEY   Administration





     





     





     





     


 


Benzonatate  100 mg  02/04/20 15:24  02/05/20 07:42





  Tessalon  PO   100 mg





  Q4H PRN   Administration





  Cough   





     





     





     


 


Bisacodyl  10 mg  02/04/20 15:19  02/05/20 10:50





  Dulcolax  PO   10 mg





  DAILYPRN PRN   Administration





  Constipation   





     





     





     


 


Calcium Carbonate  1,000 mg  02/04/20 15:19  02/07/20 23:30





  Tums  PO   1,000 mg





  Q4H PRN   Administration





  Heartburn  or Indigestion   





     





     





     


 


Clonidine  0.1 mg  02/06/20 21:00  02/08/20 09:52





  Catapres  PO   0.1 mg





  BID MILEY   Administration





     





     





     





     


 


Clopidogrel Bisulfate  75 mg  02/05/20 09:00  02/08/20 09:52





  Plavix  PO   75 mg





  DAILY MILEY   Administration





     





     





     





     


 


Enoxaparin Sodium  70 mg  02/06/20 21:00  02/08/20 09:52





  Lovenox  SC   70 mg





  0900,2100 MILEY   Administration





     





     





     





     


 


Famotidine  20 mg  02/04/20 21:00  02/08/20 09:52





  Pepcid  PO   20 mg





  BID MILEY   Administration





     





     





     





     


 


Guaifenesin  600 mg  02/06/20 21:00  02/08/20 09:52





  Mucinex  PO   600 mg





  Q12HR MILEY   Administration





     





     





     





     


 


Insulin Glargine 20 units/  0.2 mls @ 0 mls/hr  02/05/20 09:00  02/08/20 09:52





  Miscellaneous Medication  SC   0.2 mls





  QAM MILEY   Administration





     





     





     





     


 


Insulin Glargine 10 units/  0.1 mls @ 0 mls/hr  02/07/20 21:00  02/07/20 21:06





  Miscellaneous Medication  SC   0.1 mls





  HS MILEY   Administration





     





     





     





     


 


Insulin Human Lispro  0 units  02/04/20 15:27  02/06/20 20:50





  Humalog  SC   4 unit





  .BEDTIME SLIDING SC PRN   Administration





  Bedtime Correctional Scale   





     





     





     


 


Insulin Human Lispro  0 units  02/07/20 06:03  02/08/20 17:02





  Humalog  SC   10 unit





  .MODERATE SLIDING SC PRN   Administration





  Moderate Correctional Scale   





     





     





     


 


Isosorbide Mononitrate  60 mg  02/05/20 09:00  02/08/20 09:53





  Imdur  PO   60 mg





  DAILY MILEY   Administration





     





     





     





     


 


Mometasone Furoate/Formoterol Fumar  1 puff  02/04/20 18:30  02/08/20 19:02





  Dulera 100 Mcg/5 Mcg Inhaler  INH   1 puff





  BID-RT MILEY   Administration





     





     





     





     


 


Nitroglycerin  0.4 mg  02/04/20 15:18  02/07/20 02:20





  Nitrostat  SL   0.4 mg





  Q5MIN PRN   Administration





  Chest Pain   





     





     





     


 


Nitroglycerin  1 inch  02/06/20 12:00  02/08/20 17:02





  Nitro-Bid 2% Ointment  TOP   1 inch





  Q6HR MILEY   Administration





     





     





     





     


 


Ondansetron HCl  4 mg  02/04/20 15:19  02/05/20 17:48





  Zofran  IVP   4 mg





  Q6H PRN   Administration





  Nausea/Vomiting   





     





     





     


 


Polyethylene Glycol  17 gm  02/07/20 09:00  02/08/20 09:51





  Miralax  PO   17 gm





  DAILY MILEY   Administration





     





     





     





     


 


Ranolazine  1,000 mg  02/04/20 21:00  02/08/20 09:52





  Ranexa  PO   1,000 mg





  BID MILEY   Administration





     





     





     





     


 


Sodium Chloride  10 ml  02/06/20 21:00  02/08/20 09:53





  Flush - Normal Saline  IVF   10 ml





  Q12HR MILEY   Administration





     





     





     





     


 


Valsartan  80 mg  02/06/20 21:00  02/08/20 09:53





  Diovan  PO   80 mg





  BID MILEY   Administration





     





     





     





     














- Exam


General Appearance: NAD


Heart: RRR, no gallops, no rubs, normal peripheral pulses


Respiratory: no rales, no ronchi, normal chest expansion, wheezes


Gastrointestinal: non-tender, non-distended, normal bowel sounds, tender to 

palpation


Extremities: no cyanosis, no edema


Neurological: no new deficit


Psychiatric: normal affect, A&O x 3





Hosp A/P





- Plan


DVT proph w/lovenox, DVT proph w/SCDs





Acute respiratory failure with hypoxemia


Unstable angina


COPD exacerbation


HTN


CAD


Polysubstance abuse


Other issues per previous notes





PLAN:


Change Atbx and Steroids to PO


Cont ASA/Plavix


Cont Nebs Q4h


on full Lovenox


Cont Lantus 20 units QAM and 10 units HS with moderate sliding scale


Cont current dose of Clonidine/Valsartan/Imdur


Amlodipine on hold


No betablockers due to Cocaine abuse


AM labs

## 2020-02-09 NOTE — PRG
DATE OF SERVICE:  02/09/2020



SUBJECTIVE:  This morning, she is doing better, less short of breath, less cough.



OBJECTIVE:  VITAL SIGNS:  Pulse 72, respiratory rate 21, saturations 96% on room

air, and blood pressure 148/79. 

CHEST:  Decreased breath sounds.  No wheezing. 

CARDIAC:  Normal S1 and S2.  No gallops. 

ABDOMEN:  No masses.



LABORATORY DATA:  Unremarkable.



ASSESSMENT AND PLAN:  Congestive heart failure, chronic obstructive pulmonary

disease, and respiratory failure.  Continue steroids, neb treatment, antibiotics. 



We will follow.







Job ID:  [id number]

## 2020-02-09 NOTE — PDOC.HOSPP
- Subjective


Encounter Date: 02/09/20


Encounter Time: 17:00


Subjective: 





Patient seen and examined for Resp failure. SOB improving. No CP. No new 

complaints. No overnight events





- Objective


Vital Signs & Weight: 


 Vital Signs (12 hours)











  Temp Pulse Resp BP BP Pulse Ox


 


 02/09/20 21:33     121/71  


 


 02/09/20 21:12     121/71  


 


 02/09/20 19:59       95


 


 02/09/20 19:40       93 L


 


 02/09/20 19:39   99  22 H    93 L


 


 02/09/20 19:38       93 L


 


 02/09/20 19:10  98.0 F  95  18   135/72  98


 


 02/09/20 15:17  97.4 F L     


 


 02/09/20 14:25   85  20    94 L


 


 02/09/20 11:35  97.1 F L     


 


 02/09/20 10:58   72  22 H    96








 Weight











Admit Weight                   147 lb 11.2 oz


 


Weight                         158 lb 9.6 oz











 Most Recent Monitor Data











Heart Rate from ECG            108


 


NIBP                           121/71


 


NIBP BP-Mean                   87


 


Respiration from ECG           24


 


SpO2                           96














I&O: 


 











 02/08/20 02/09/20 02/10/20





 06:59 06:59 06:59


 


Intake Total 820 1150 650


 


Output Total 900 1650 450


 


Balance -80 -500 200











Result Diagrams: 


 02/09/20 03:30





 02/09/20 03:30


Additional Labs: 


 Accuchecks











  02/09/20 02/09/20 02/09/20





  20:36 16:24 10:43


 


POC Glucose  388 H  144 H  236 H














  02/09/20





  05:39


 


POC Glucose  201 H











EKG Reviewed by me: Yes (Tele SR)





Hospitalist ROS





- Review of Systems


Respiratory: denies: cough, dry, shortness of breath, hemoptysis, SOB with 

excertion, pleuritic pain, sputum, wheezing, other


Cardiovascular: denies: chest pain, palpitations, orthopnea, paroxysmal noc. 

dyspnea, edema, light headedness, other





- Medication


Medications: 


Active Medications











Generic Name Dose Route Start Last Admin





  Trade Name Freq  PRN Reason Stop Dose Admin


 


Acetaminophen  650 mg  02/04/20 15:19  02/09/20 16:15





  Tylenol  PO   650 mg





  Q4H PRN   Administration





  Headache/Fever/Mild Pain (1-3)   





     





     





     


 


Hydrocodone Bitart/Acetaminophen  1 tab  02/04/20 15:19  02/08/20 20:31





  Norco 7.5/325  PO   1 tab





  Q4H PRN   Administration





  Severe Pain (7-10)   





     





     





     


 


Albuterol/Ipratropium  3 ml  02/06/20 11:00  02/09/20 19:39





  Duoneb  EZPAP   3 ml





  [id number] MILEY   Administration





     





     





     





     


 


Amlodipine Besylate  5 mg  02/06/20 21:00  02/06/20 21:21





  Norvasc  PO   Not Given





  BID MILEY   





     





     





     





     


 


Aspirin  81 mg  02/05/20 09:00  02/09/20 10:05





  Ecotrin  PO   81 mg





  DAILY MILEY   Administration





     





     





     





     


 


Atorvastatin Calcium  40 mg  02/04/20 21:00  02/09/20 20:50





  Lipitor  PO   40 mg





  HS MILEY   Administration





     





     





     





     


 


Benzonatate  100 mg  02/04/20 15:24  02/05/20 07:42





  Tessalon  PO   100 mg





  Q4H PRN   Administration





  Cough   





     





     





     


 


Bisacodyl  10 mg  02/04/20 15:19  02/05/20 10:50





  Dulcolax  PO   10 mg





  DAILYPRN PRN   Administration





  Constipation   





     





     





     


 


Calcium Carbonate  1,000 mg  02/04/20 15:19  02/07/20 23:30





  Tums  PO   1,000 mg





  Q4H PRN   Administration





  Heartburn  or Indigestion   





     





     





     


 


Cefdinir  300 mg  02/08/20 21:00  02/09/20 20:50





  Omnicef  PO   300 mg





  BID MILEY   Administration





     





     





     





     


 


Clonidine  0.1 mg  02/09/20 21:00  02/09/20 21:12





  Catapres  PO   0.1 mg





  BID MILEY   Administration





     





     





     





     


 


Clopidogrel Bisulfate  75 mg  02/05/20 09:00  02/09/20 10:07





  Plavix  PO   75 mg





  DAILY MILEY   Administration





     





     





     





     


 


Doxycycline Hyclate  100 mg  02/08/20 21:00  02/09/20 20:50





  Vibramycin  PO   100 mg





  BID MILEY   Administration





     





     





     





     


 


Enoxaparin Sodium  70 mg  02/06/20 21:00  02/09/20 20:49





  Lovenox  SC   70 mg





  0900,2100 MILEY   Administration





     





     





     





     


 


Famotidine  20 mg  02/04/20 21:00  02/09/20 20:51





  Pepcid  PO   20 mg





  BID MILEY   Administration





     





     





     





     


 


Guaifenesin  600 mg  02/06/20 21:00  02/09/20 20:50





  Mucinex  PO   600 mg





  Q12HR MILEY   Administration





     





     





     





     


 


Insulin Glargine 20 units/  0.2 mls @ 0 mls/hr  02/05/20 09:00  02/09/20 10:40





  Miscellaneous Medication  SC   0.2 mls





  QAM MILEY   Administration





     





     





     





     


 


Insulin Glargine 10 units/  0.1 mls @ 0 mls/hr  02/07/20 21:00  02/09/20 20:49





  Miscellaneous Medication  SC   0.1 mls





  HS MILEY   Administration





     





     





     





     


 


Insulin Human Lispro  0 units  02/04/20 15:27  02/09/20 20:55





  Humalog  SC   5 unit





  .BEDTIME SLIDING SC PRN   Administration





  Bedtime Correctional Scale   





     





     





     


 


Insulin Human Lispro  0 units  02/07/20 06:03  02/09/20 10:40





  Humalog  SC   4 unit





  .MODERATE SLIDING SC PRN   Administration





  Moderate Correctional Scale   





     





     





     


 


Isosorbide Mononitrate  60 mg  02/05/20 09:00  02/09/20 10:08





  Imdur  PO   60 mg





  DAILY MILEY   Administration





     





     





     





     


 


Melatonin  3 mg  02/04/20 15:24  02/08/20 20:25





  Melatonin  PO   3 mg





  HS PRN   Administration





  Insomnia   





     





     





     


 


Mometasone Furoate/Formoterol Fumar  1 puff  02/04/20 18:30  02/09/20 19:38





  Dulera 100 Mcg/5 Mcg Inhaler  INH   1 puff





  BID-RT MILEY   Administration





     





     





     





     


 


Nitroglycerin  0.4 mg  02/04/20 15:18  02/07/20 02:20





  Nitrostat  SL   0.4 mg





  Q5MIN PRN   Administration





  Chest Pain   





     





     





     


 


Nitroglycerin  1 inch  02/06/20 12:00  02/09/20 18:26





  Nitro-Bid 2% Ointment  TOP   1 inch





  Q6HR MILEY   Administration





     





     





     





     


 


Ondansetron HCl  4 mg  02/04/20 15:19  02/05/20 17:48





  Zofran  IVP   4 mg





  Q6H PRN   Administration





  Nausea/Vomiting   





     





     





     


 


Polyethylene Glycol  17 gm  02/07/20 09:00  02/09/20 10:09





  Miralax  PO   Not Given





  DAILY MILEY   





     





     





     





     


 


Prednisone  20 mg  02/09/20 08:00  02/09/20 16:16





  Prednisone  PO   20 mg





  BID-WM MILEY   Administration





     





     





     





     


 


Ranolazine  1,000 mg  02/04/20 21:00  02/09/20 20:50





  Ranexa  PO   1,000 mg





  BID MILEY   Administration





     





     





     





     


 


Sodium Chloride  10 ml  02/06/20 21:00  02/09/20 20:51





  Flush - Normal Saline  IVF   10 ml





  Q12HR MI[facility]   Administration





     





     





     





     


 


Valsartan  80 mg  02/06/20 21:00  02/09/20 20:50





  Diovan  PO   80 mg





  BID [facility]   Administration





     





     





     





     














- Exam


General Appearance: NAD


Heart: RRR, no gallops


Respiratory: no rales, no ronchi, wheezes


Gastrointestinal: non-tender, non-distended, normal bowel sounds


Extremities: no cyanosis


Neurological: no new deficit





Hosp A/P





- Plan


DVT proph w/lovenox





Acute respiratory failure with hypoxemia


Unstable angina


COPD exacerbation


HTN


CAD


Polysubstance abuse


Other issues per previous notes





PLAN:


Cont PO Atbx/Steroids


Cont ASA/Plavix


Cont Nebs Q4h


DC Lovenox


Cont Lantus 20 units QAM and 10 units HS with moderate sliding scale


Cont current dose of Clonidine/Valsartan/Imdur


Resume Amlodipine if needed


No betablockers due to Cocaine abuse


AM labs


Transfer to tele

## 2020-02-09 NOTE — PDOC.CPN
- Subjective


Date: 02/09/20


Time: 08:28


Interval history: 





feeling better.  Less SOB





- Objective


Allergies/Adverse Reactions: 


 Allergies











Allergy/AdvReac Type Severity Reaction Status Date / Time


 


No Known Drug Allergies Allergy Unknown  Verified 02/04/20 23:57











Visit Medications: 


 Current Medications





Acetaminophen (Tylenol)  650 mg PO Q4H PRN


   PRN Reason: Headache/Fever/Mild Pain (1-3)


   Last Admin: 02/07/20 02:19 Dose:  650 mg


Hydrocodone Bitart/Acetaminophen (Norco 5/325)  1 tab PO Q4H PRN


   PRN Reason: Moderate Pain (4-6)


Hydrocodone Bitart/Acetaminophen (Norco 7.5/325)  1 tab PO Q4H PRN


   PRN Reason: Severe Pain (7-10)


   Last Admin: 02/08/20 20:31 Dose:  1 tab


Albuterol/Ipratropium (Duoneb)  3 ml NEB [id number] PRN


   PRN Reason: SOB &/or Wheezing


Albuterol/Ipratropium (Duoneb)  3 ml EZPAP [id number]


   Last Admin: 02/09/20 08:10 Dose:  3 ml


Amlodipine Besylate (Norvasc)  5 mg PO BID [facility]


   Last Admin: 02/06/20 21:21 Dose:  Not Given


Aspirin (Ecotrin)  81 mg PO DAILY [facility]


   Last Admin: 02/08/20 09:52 Dose:  81 mg


Atorvastatin Calcium (Lipitor)  40 mg PO HS [facility]


   Last Admin: 02/08/20 20:25 Dose:  40 mg


Benzonatate (Tessalon)  100 mg PO Q4H PRN


   PRN Reason: Cough


   Last Admin: 02/05/20 07:42 Dose:  100 mg


Bisacodyl (Dulcolax)  10 mg PO DAILYPRN PRN


   PRN Reason: Constipation


   Last Admin: 02/05/20 10:50 Dose:  10 mg


Calcium Carbonate (Tums)  1,000 mg PO Q4H PRN


   PRN Reason: Heartburn  or Indigestion


   Last Admin: 02/07/20 23:30 Dose:  1,000 mg


Cefdinir (Omnicef)  300 mg PO BID [facility]


   Last Admin: 02/08/20 20:25 Dose:  300 mg


Clonidine (Catapres)  0.1 mg PO BID [facility]


   Last Admin: 02/08/20 20:25 Dose:  0.1 mg


Clopidogrel Bisulfate (Plavix)  75 mg PO DAILY [facility]


   Last Admin: 02/08/20 09:52 Dose:  75 mg


Dextrose/Water (Dextrose 50%)  25 gm SLOW IVP PRN PRN


   PRN Reason: Hypoglycemia


Diphenhydramine HCl (Benadryl)  25 mg PO Q6H PRN


   PRN Reason: Itching & Insomnia


Docusate Sodium (Colace)  100 mg PO BIDPRN PRN


   PRN Reason: Constipation


Doxycycline Hyclate (Vibramycin)  100 mg PO BID [facility]


   Last Admin: 02/08/20 20:26 Dose:  100 mg


Enoxaparin Sodium (Lovenox)  70 mg SC 0900,2100 [facility]


   Last Admin: 02/08/20 20:24 Dose:  70 mg


Famotidine (Pepcid)  20 mg PO BID [facility]


   Last Admin: 02/08/20 20:25 Dose:  20 mg


Glucagon (Glucagon)  1 mg IM PRN PRN


   PRN Reason: Hypoglycemia


Guaifenesin (Mucinex)  600 mg PO Q12HR [facility]


   Last Admin: 02/08/20 20:25 Dose:  600 mg


Dextrose/Water (D5w)  1,000 mls @ 0 mls/hr IV .Q0M PRN


   PRN Reason: Hypoglycemia


Insulin Glargine 20 units/ (Miscellaneous Medication)  0.2 mls @ 0 mls/hr SC 

QAM [facility]


   Last Admin: 02/08/20 09:52 Dose:  0.2 mls


Insulin Glargine 10 units/ (Miscellaneous Medication)  0.1 mls @ 0 mls/hr SC HS 

[facility]


   Last Admin: 02/08/20 20:27 Dose:  0.1 mls


Insulin Human Lispro (Humalog)  0 units SC .BEDTIME SLIDING SC PRN


   PRN Reason: Bedtime Correctional Scale


   Last Admin: 02/06/20 20:50 Dose:  4 unit


Insulin Human Lispro (Humalog)  0 units SC .MODERATE SLIDING SC PRN


   PRN Reason: Moderate Correctional Scale


   Last Admin: 02/09/20 05:53 Dose:  4 unit


Isosorbide Mononitrate (Imdur)  60 mg PO DAILY [facility]


   Last Admin: 02/08/20 09:53 Dose:  60 mg


Labetalol HCl (Normodyne)  10 mg SLOW IVP Q4H PRN


   PRN Reason: SBP Greater Than 180


Loperamide HCl (Imodium)  2 mg PO PRN PRN


   PRN Reason: Diarrhea/Loose Stools


Melatonin (Melatonin)  3 mg PO HS PRN


   PRN Reason: Insomnia


   Last Admin: 02/08/20 20:25 Dose:  3 mg


Mometasone Furoate/Formoterol Fumar (Dulera 100 Mcg/5 Mcg Inhaler)  1 puff INH 

BID-RT [facility]


   Last Admin: 02/09/20 08:24 Dose:  1 puff


Nitroglycerin (Nitrostat)  0.4 mg SL Q5MIN PRN


   PRN Reason: Chest Pain


   Last Admin: 02/07/20 02:20 Dose:  0.4 mg


Nitroglycerin (Nitro-Bid 2% Ointment)  1 inch TOP Q6HR [facility]


   Last Admin: 02/09/20 05:32 Dose:  1 inch


Ondansetron HCl (Zofran Odt)  4 mg PO Q6H PRN


   PRN Reason: Nausea/Vomiting


Ondansetron HCl (Zofran)  4 mg IVP Q6H PRN


   PRN Reason: Nausea/Vomiting


   Last Admin: 02/05/20 17:48 Dose:  4 mg


Polyethylene Glycol (Miralax)  17 gm PO DAILY [facility]


   Last Admin: 02/08/20 09:51 Dose:  17 gm


Prednisone (Prednisone)  20 mg PO BID-[facility]


Ranolazine (Ranexa)  1,000 mg PO BID [facility]


   Last Admin: 02/08/20 20:25 Dose:  1,000 mg


Sodium Chloride (Flush - Normal Saline)  10 ml IVF Q12HR [facility]


   Last Admin: 02/08/20 20:26 Dose:  10 ml


Sodium Chloride (Flush - Normal Saline)  10 ml IVF PRN PRN


   PRN Reason: Saline Flush


Throat Lozenges (Cepastat Lozenges)  1 maryanne PO Q2H PRN


   PRN Reason: Sore Throat


Valsartan (Diovan)  80 mg PO BID [facility]


   Last Admin: 02/08/20 20:26 Dose:  80 mg








Vital Signs & Weight: 


 Vital Signs











  Temp Pulse Resp Pulse Ox


 


 02/09/20 08:10   76  20  95


 


 02/09/20 07:19  97.3 F L   


 


 02/09/20 03:37  97.7 F   


 


 02/08/20 23:43  98.7 F   








 











Admit Weight                   147 lb 11.2 oz


 


Weight                         158 lb 9.6 oz

















- Physical Exam


General: no apparent distress


Neck: no masses


Cardiac: regular rate, regular rhythm


Lungs: normal exam


Neuro: grossly intact


Abdomen: active bowel sounds





- Labs


Result Diagrams: 


 02/09/20 03:30





 02/09/20 03:30


 Troponin/CKMB











CK-MB (CK-2)  1.1 ng/mL (0-6.6)   02/05/20  08:56    


 


Troponin I  0.022 ng/mL (< 0.028)   02/06/20  16:19    














- Assessment/Plan


Assessment/Plan: 





New onset cardiomyopathy


bronchitis


hyperthyroidism





Plan 2/9


No changes


ACEI, lasix


stop drug use





BB if tolerated


ACEI, lasix


As long as stable, recomend aggressive medical therapy given hypoerthyroidism


Will need lifevest prior to DC


Abx

## 2020-02-10 NOTE — PDOC.HOSPP
- Subjective


Encounter Date: 02/10/20


Encounter Time: 11:00


Subjective: 





Patient seen and examined for Resp failure/SOB. Intermittent CP with some 

diaphoresis. No fever or chills. No other complaints. No overnight events





- Objective


Vital Signs & Weight: 


 Vital Signs (12 hours)











  Temp Pulse Resp BP BP Pulse Ox


 


 02/10/20 15:39  98.1 F  94  22 H   134/62  93 L


 


 02/10/20 14:57   80  14   


 


 02/10/20 12:11  97.8 F  70  20   126/68  94 L


 


 02/10/20 10:51     121/65  


 


 02/10/20 10:47   73  14   


 


 02/10/20 08:00  97.8 F  83  20   141/76 H  96


 


 02/10/20 06:45   80  14   








 Weight











Admit Weight                   147 lb 11.2 oz


 


Weight                         158 lb 9.6 oz











 Most Recent Monitor Data











Heart Rate from ECG            108


 


NIBP                           121/71


 


NIBP BP-Mean                   87


 


Respiration from ECG           24


 


SpO2                           96














I&O: 


 











 02/09/20 02/10/20 02/11/20





 06:59 06:59 06:59


 


Intake Total 1150 650 


 


Output Total 1650 450 


 


Balance -500 200 











Result Diagrams: 


 02/10/20 03:52





 02/10/20 03:52


Additional Labs: 


 Accuchecks











  02/10/20 02/10/20 02/10/20





  16:51 11:11 05:31


 


POC Glucose  358 H  159 H  188 H














  02/09/20





  20:36


 


POC Glucose  388 H











EKG Reviewed by me: Yes (Tele SR)





Hospitalist ROS





- Review of Systems


Respiratory: denies: cough, dry, shortness of breath, hemoptysis, SOB with 

excertion, pleuritic pain, sputum, wheezing, other


Gastrointestinal: denies: nausea, vomiting, abdominal pain, diarrhea, 

constipation, melena, hematochezia, other





- Medication


Medications: 


Active Medications











Generic Name Dose Route Start Last Admin





  Trade Name Freq  PRN Reason Stop Dose Admin


 


Acetaminophen  650 mg  02/04/20 15:19  02/09/20 16:15





  Tylenol  PO   650 mg





  Q4H PRN   Administration





  Headache/Fever/Mild Pain (1-3)   





     





     





     


 


Hydrocodone Bitart/Acetaminophen  1 tab  02/04/20 15:19  02/10/20 05:40





  Mobile 5/325  PO   1 tab





  Q4H PRN   Administration





  Moderate Pain (4-6)   





     





     





     


 


Hydrocodone Bitart/Acetaminophen  1 tab  02/04/20 15:19  02/08/20 20:31





  Norco 7.5/325  PO   1 tab





  Q4H PRN   Administration





  Severe Pain (7-10)   





     





     





     


 


Albuterol/Ipratropium  3 ml  02/04/20 15:24  02/10/20 05:31





  Duoneb  NEB   3 ml





  Q8AI-AC PRN   Administration





  SOB &/or Wheezing   





     





     





     


 


Albuterol/Ipratropium  3 ml  02/06/20 11:00  02/10/20 14:57





  Duoneb  EZPAP   3 ml





  [id number] [facility]   Administration





     





     





     





     


 


Amlodipine Besylate  5 mg  02/06/20 21:00  02/06/20 21:21





  Norvasc  PO   Not Given





  BID [facility]   





     





     





     





     


 


Aspirin  81 mg  02/05/20 09:00  02/10/20 08:40





  Ecotrin  PO   81 mg





  DAILY [facility]   Administration





     





     





     





     


 


Atorvastatin Calcium  40 mg  02/04/20 21:00  02/09/20 20:50





  Lipitor  PO   40 mg





  HS [facility]   Administration





     





     





     





     


 


Benzonatate  100 mg  02/04/20 15:24  02/05/20 07:42





  Tessalon  PO   100 mg





  Q4H PRN   Administration





  Cough   





     





     





     


 


Bisacodyl  10 mg  02/04/20 15:19  02/05/20 10:50





  Dulcolax  PO   10 mg





  DAILYPRN PRN   Administration





  Constipation   





     





     





     


 


Calcium Carbonate  1,000 mg  02/04/20 15:19  02/07/20 23:30





  Tums  PO   1,000 mg





  Q4H PRN   Administration





  Heartburn  or Indigestion   





     





     





     


 


Carvedilol  6.25 mg  02/10/20 17:00  02/10/20 17:50





  Coreg  PO   6.25 mg





  BID-WM [facility]   Administration





     





     





     





     


 


Cefdinir  300 mg  02/08/20 21:00  02/10/20 08:40





  Omnicef  PO   300 mg





  BID [facility]   Administration





     





     





     





     


 


Clonidine  0.1 mg  02/10/20 09:00  02/10/20 15:41





  Catapres  PO   0.1 mg





  TID [facility]   Administration





     





     





     





     


 


Clopidogrel Bisulfate  75 mg  02/05/20 09:00  02/10/20 08:39





  Plavix  PO   75 mg





  DAILY [facility]   Administration





     





     





     





     


 


Doxycycline Hyclate  100 mg  02/08/20 21:00  02/10/20 08:39





  Vibramycin  PO   100 mg





  BID [facility]   Administration





     





     





     





     


 


Enoxaparin Sodium  70 mg  02/10/20 09:00  02/10/20 08:41





  Lovenox  SC   70 mg





  0900,2100 [facility]   Administration





     





     





     





     


 


Famotidine  20 mg  02/04/20 21:00  02/10/20 08:39





  Pepcid  PO   20 mg





  BID [facility]   Administration





     





     





     





     


 


Guaifenesin  600 mg  02/06/20 21:00  02/10/20 08:40





  Mucinex  PO   600 mg





  Q12HR [facility]   Administration





     





     





     





     


 


Insulin Glargine 20 units/  0.2 mls @ 0 mls/hr  02/05/20 09:00  02/10/20 09:04





  Miscellaneous Medication  SC   0.2 mls





  QAM [facility]   Administration





     





     





     





     


 


Insulin Glargine 10 units/  0.1 mls @ 0 mls/hr  02/07/20 21:00  02/09/20 20:49





  Miscellaneous Medication  SC   0.1 mls





  HS [facility]   Administration





     





     





     





     


 


Insulin Human Lispro  0 units  02/04/20 15:27  02/09/20 20:55





  Humalog  SC   5 unit





  .BEDTIME SLIDING SC PRN   Administration





  Bedtime Correctional Scale   





     





     





     


 


Insulin Human Lispro  0 units  02/07/20 06:03  02/09/20 10:40





  Humalog  SC   4 unit





  .MODERATE SLIDING SC PRN   Administration





  Moderate Correctional Scale   





     





     





     


 


Isosorbide Mononitrate  60 mg  02/05/20 09:00  02/10/20 08:39





  Imdur  PO   60 mg





  DAILY [facility]   Administration





     





     





     





     


 


Melatonin  3 mg  02/04/20 15:24  02/08/20 20:25





  Melatonin  PO   3 mg





  HS PRN   Administration





  Insomnia   





     





     





     


 


Mometasone Furoate/Formoterol Fumar  1 puff  02/04/20 18:30  02/10/20 06:43





  Dulera 100 Mcg/5 Mcg Inhaler  INH   1 puff





  BID-RT [facility]   Administration





     





     





     





     


 


Nitroglycerin  0.4 mg  02/04/20 15:18  02/10/20 07:47





  Nitrostat  SL   0.4 mg





  Q5MIN PRN   Administration





  Chest Pain   





     





     





     


 


Nitroglycerin  1 inch  02/06/20 12:00  02/10/20 17:51





  Nitro-Bid 2% Ointment  TOP   1 inch





  Q6HR [facility]   Administration





     





     





     





     


 


Ondansetron HCl  4 mg  02/04/20 15:19  02/05/20 17:48





  Zofran  IVP   4 mg





  Q6H PRN   Administration





  Nausea/Vomiting   





     





     





     


 


Polyethylene Glycol  17 gm  02/07/20 09:00  02/10/20 08:42





  Miralax  PO   Not Given





  DAILY [facility]   





     





     





     





     


 


Prednisone  20 mg  02/09/20 08:00  02/10/20 17:51





  Prednisone  PO   20 mg





  BID-WM [facility]   Administration





     





     





     





     


 


Ranolazine  1,000 mg  02/04/20 21:00  02/10/20 08:39





  Ranexa  PO   1,000 mg





  BID [facility]   Administration





     





     





     





     


 


Sodium Chloride  10 ml  02/06/20 21:00  02/10/20 08:42





  Flush - Normal Saline  IVF   10 ml





  Q12HR [facility]   Administration





     





     





     





     


 


Valsartan  80 mg  02/06/20 21:00  02/10/20 08:40





  Diovan  PO   80 mg





  BID [facility]   Administration





     





     





     





     














- Exam


General Appearance: NAD


Heart: RRR, no gallops


Respiratory: rhonchi, wheezes


Gastrointestinal: soft, non-tender, normal bowel sounds


Extremities: no cyanosis


Psychiatric: normal affect, A&O x 3





Hosp A/P





- Plan


DVT proph w/lovenox





Acute respiratory failure with hypoxemia


Unstable angina


COPD exacerbation


HTN


CAD


Polysubstance abuse


Other issues per previous notes





PLAN:


Cont ASA/Plavix


Cont Nebs Q4h with PO Atbx/Steroids


Lovenox restarted due to CP


Cont Lantus 20 units QAM and 10 units HS with moderate sliding scale


Cont current dose of Valsartan/Imdur


Increase Clonidine to 0.1 mg TID


Resume Amlodipine


Coreg restarted today

## 2020-02-10 NOTE — PRG
DATE OF SERVICE:  02/10/2020



SUBJECTIVE:  She is feeling better.  She can ambulate.  She says she is less short

of breath. 



OBJECTIVE:  VITAL SIGNS:  Temperature is 98.0, pulse 80, respirations 14, O2

saturation is 99% on 2 L, and blood pressure 131/66. 

HEENT:  Unremarkable. 

NECK:  No adenopathy or JVD. 

LUNGS:  Rhonchi on the left side. 

CARDIAC:  S1 and S2.  Regular. 

ABDOMEN:  Soft. 

EXTREMITIES:  No edema.



LABORATORY DATA:  White blood cell count 13.4 ,hematocrit 38.8, and platelet count

208.  Sodium 137, potassium 3.9, chloride 102, CO2 of 26, BUN 23, creatinine 0.8,

glucose 242. 



ASSESSMENT:  

1. Chronic obstructive pulmonary disease exacerbation.

2. Chronic hypoxemic and hypercapnic respiratory failure.

3. Systolic heart failure with mitral regurgitation.



PLAN:  Probably at the point where she can be discharged, she has been converted

over to oral antibiotics and oral steroids.  Increase activity as tolerated.  No

further Pulmonary recommendations.  We will sign off. 







Job ID:  [id number]

## 2020-02-10 NOTE — PRG
DATE OF SERVICE:  02/10/2020



SUBJECTIVE:  Ms. Villarreal is having no recurrent chest pain.  This morning, she feels

better now. 



She did require three nitroglycerin this morning and another nitroglycerin later.



OBJECTIVE:  VITAL SIGNS:  Blood pressure was 190 to 200 systolic.  The nurse told me

prior to the last nitroglycerin, it is 140/76.  Most recently, pulse 83. 

LUNGS:  She has mild expiratory wheezing, but dramatically improved from last week. 

CARDIAC:  Normal S1, normal S2. 

ABDOMEN:  Soft, nontender.



ASSESSMENT:  

1. Inoperable coronary artery disease.  She has had previous bypass.  No other

intervention feasible. 

2. Chronic obstructive pulmonary disease.  

3. Substance abuse.



PLAN:  

1. Resume low-dose Coreg.  We will only give a low dose as she had bradycardia

earlier during this admission. 

2. She is on amlodipine.

3. She is on Lovenox and aspirin and Plavix.  Long-term prognosis is guarded.  If

she has any bradycardia at all, we will probably reduce the carvedilol dose as she

was significantly bradycardic on 02/05/2020. 







Job ID:  [id number]

## 2020-02-11 NOTE — PRG
DATE OF SERVICE:  02/11/2020



SUBJECTIVE:  Ms. Villarreal is doing better.  No complaints.  Did not have any chest

pain today. 



OBJECTIVE:  VITAL SIGNS:  Her blood pressure most recently 129/66, pulse 74 and

regular. 

LUNGS:  Clear. 

CARDIAC:  Normal S1, normal S2. 

ABDOMEN:  Soft, nontender. 

EXTREMITIES:  No edema.



ASSESSMENT:  

1. Coronary artery disease, severe, not a reoperative candidate.  No other

percutaneous intervention feasible due to distal disease. 

2. Severe peripheral vascular disease.

3. Diabetes.

4. Substance abuse.

5. Hypertension.

6. Bradycardia earlier during this admission.



PLAN:  

1. She is on low-dose carvedilol.

2. We will probably reduce clonidine dose to try to avoid bradycardia.  She had a

significant bradycardia earlier during the hospitalization. 

3. Change her Procardia XL from amlodipine and keep 1 or 2 more days here.







Job ID:  [id number]

## 2020-02-11 NOTE — PDOC.HOSPP
- Subjective


Encounter Date: 02/11/20


Encounter Time: 14:00


Subjective: 





Patient seen and examined for Resp failure. SOB improving. No CP. No new 

complaints. No overnight events





- Objective


Vital Signs & Weight: 


 Vital Signs (12 hours)











  Temp Pulse Pulse Pulse Pulse Resp BP


 


 02/11/20 15:11  98.1 F  85     18 


 


 02/11/20 14:09   74     16 


 


 02/11/20 13:37    84   86   120/64


 


 02/11/20 11:14  98.2 F  74     18 


 


 02/11/20 10:56   79     16 


 


 02/11/20 09:27     85  77  


 


 02/11/20 07:26   82     16 


 


 02/11/20 07:12  97.9 F  68     18 














  BP BP BP Pulse Ox Pulse Ox Pulse Ox Pulse Ox


 


 02/11/20 15:11    132/70  96   


 


 02/11/20 14:09     97   


 


 02/11/20 13:37   128/60    91 L  95  95


 


 02/11/20 11:14    129/66  95   


 


 02/11/20 10:56     96   


 


 02/11/20 09:27  147/69 H  129/67     


 


 02/11/20 07:26     96   


 


 02/11/20 07:12    141/77 H  95   








 Weight











Admit Weight                   147 lb 11.2 oz


 


Weight                         158 lb 9.6 oz











 Most Recent Monitor Data











Heart Rate from ECG            108


 


NIBP                           121/71


 


NIBP BP-Mean                   87


 


Respiration from ECG           24


 


SpO2                           96














I&O: 


 











 02/10/20 02/11/20 02/12/20





 06:59 06:59 06:59


 


Intake Total 650 240 


 


Output Total 450 500 


 


Balance 200 -260 











Result Diagrams: 


 02/10/20 03:52





 02/10/20 03:52


Additional Labs: 


 Accuchecks











  02/11/20 02/11/20 02/11/20





  16:35 10:36 05:36


 


POC Glucose  280 H  168 H  237 H














  02/10/20





  20:18


 


POC Glucose  292 H











EKG Reviewed by me: Yes (Tele SR)





Hospitalist ROS





- Review of Systems


Respiratory: denies: cough, dry, shortness of breath, hemoptysis, SOB with 

excertion, pleuritic pain, sputum, wheezing, other


Cardiovascular: denies: chest pain, palpitations, orthopnea, paroxysmal noc. 

dyspnea, edema, light headedness, other





- Medication


Medications: 


Active Medications











Generic Name Dose Route Start Last Admin





  Trade Name Freq  PRN Reason Stop Dose Admin


 


Acetaminophen  650 mg  02/04/20 15:19  02/09/20 16:15





  Tylenol  PO   650 mg





  Q4H PRN   Administration





  Headache/Fever/Mild Pain (1-3)   





     





     





     


 


Hydrocodone Bitart/Acetaminophen  1 tab  02/04/20 15:19  02/10/20 05:40





  [place] 5/325  PO   1 tab





  Q4H PRN   Administration





  Moderate Pain (4-6)   





     





     





     


 


Hydrocodone Bitart/Acetaminophen  1 tab  02/04/20 15:19  02/08/20 20:31





  Norco 7.5/325  PO   1 tab





  Q4H PRN   Administration





  Severe Pain (7-10)   





     





     





     


 


Albuterol/Ipratropium  3 ml  02/04/20 15:24  02/10/20 05:31





  Duoneb  NEB   3 ml





  [id number]-GI PRN   Administration





  SOB &/or Wheezing   





     





     





     


 


Albuterol/Ipratropium  3 ml  02/06/20 11:00  02/11/20 14:09





  Duoneb  EZPAP   3 ml





  [id number] MILEY   Administration





     





     





     





     


 


Aspirin  81 mg  02/05/20 09:00  02/11/20 08:22





  Ecotrin  PO   81 mg





  DAILY MILEY   Administration





     





     





     





     


 


Atorvastatin Calcium  40 mg  02/04/20 21:00  02/10/20 21:40





  Lipitor  PO   40 mg





  HS MILEY   Administration





     





     





     





     


 


Benzonatate  100 mg  02/04/20 15:24  02/05/20 07:42





  Tessalon  PO   100 mg





  Q4H PRN   Administration





  Cough   





     





     





     


 


Bisacodyl  10 mg  02/04/20 15:19  02/05/20 10:50





  Dulcolax  PO   10 mg





  DAILYPRN PRN   Administration





  Constipation   





     





     





     


 


Calcium Carbonate  1,000 mg  02/04/20 15:19  02/07/20 23:30





  Tums  PO   1,000 mg





  Q4H PRN   Administration





  Heartburn  or Indigestion   





     





     





     


 


Carvedilol  6.25 mg  02/10/20 17:00  02/11/20 16:03





  Coreg  PO   6.25 mg





  BID-WM MILEY   Administration





     





     





     





     


 


Cefdinir  300 mg  02/08/20 21:00  02/11/20 08:21





  Omnicef  PO   300 mg





  BID MILEY   Administration





     





     





     





     


 


Clopidogrel Bisulfate  75 mg  02/05/20 09:00  02/11/20 08:21





  Plavix  PO   75 mg





  DAILY MILEY   Administration





     





     





     





     


 


Doxycycline Hyclate  100 mg  02/08/20 21:00  02/11/20 08:22





  Vibramycin  PO   100 mg





  BID MILEY   Administration





     





     





     





     


 


Enoxaparin Sodium  70 mg  02/10/20 09:00  02/11/20 08:22





  Lovenox  SC   70 mg





  0900,2100 MILEY   Administration





     





     





     





     


 


Famotidine  20 mg  02/04/20 21:00  02/11/20 08:22





  Pepcid  PO   20 mg





  BID MILEY   Administration





     





     





     





     


 


Guaifenesin  600 mg  02/06/20 21:00  02/11/20 08:22





  Mucinex  PO   600 mg





  Q12HR MILEY   Administration





     





     





     





     


 


Insulin Glargine 20 units/  0.2 mls @ 0 mls/hr  02/05/20 09:00  02/11/20 08:51





  Miscellaneous Medication  SC   0.2 mls





  QAM MILEY   Administration





     





     





     





     


 


Insulin Glargine 10 units/  0.1 mls @ 0 mls/hr  02/07/20 21:00  02/10/20 21:24





  Miscellaneous Medication  SC   0.1 mls





  HS MILEY   Administration





     





     





     





     


 


Insulin Human Lispro  0 units  02/04/20 15:27  02/10/20 21:25





  Humalog  SC   3 unit





  .BEDTIME SLIDING SC PRN   Administration





  Bedtime Correctional Scale   





     





     





     


 


Insulin Human Lispro  0 units  02/07/20 06:03  02/11/20 16:56





  Humalog  SC   6 unit





  .MODERATE SLIDING SC PRN   Administration





  Moderate Correctional Scale   





     





     





     


 


Isosorbide Mononitrate  60 mg  02/05/20 09:00  02/11/20 08:22





  Imdur  PO   60 mg





  DAILY MILEY   Administration





     





     





     





     


 


Melatonin  3 mg  02/04/20 15:24  02/08/20 20:25





  Melatonin  PO   3 mg





  HS PRN   Administration





  Insomnia   





     





     





     


 


Mometasone Furoate/Formoterol Fumar  1 puff  02/04/20 18:30  02/11/20 07:28





  Dulera 100 Mcg/5 Mcg Inhaler  INH   1 puff





  BID-RT MILEY   Administration





     





     





     





     


 


Nitroglycerin  0.4 mg  02/04/20 15:18  02/10/20 07:47





  Nitrostat  SL   0.4 mg





  Q5MIN PRN   Administration





  Chest Pain   





     





     





     


 


Nitroglycerin  1 inch  02/06/20 12:00  02/11/20 16:56





  Nitro-Bid 2% Ointment  TOP   1 inch





  Q6HR MILEY   Administration





     





     





     





     


 


Ondansetron HCl  4 mg  02/04/20 15:19  02/05/20 17:48





  Zofran  IVP   4 mg





  Q6H PRN   Administration





  Nausea/Vomiting   





     





     





     


 


Polyethylene Glycol  17 gm  02/07/20 09:00  02/11/20 08:24





  Miralax  PO   Not Given





  DAILY MILEY   





     





     





     





     


 


Prednisone  20 mg  02/09/20 08:00  02/11/20 16:03





  Prednisone  PO   20 mg





  BID-WM MILEY   Administration





     





     





     





     


 


Ranolazine  1,000 mg  02/04/20 21:00  02/11/20 08:22





  Ranexa  PO   1,000 mg





  BID MILEY   Administration





     





     





     





     


 


Sodium Chloride  10 ml  02/06/20 21:00  02/11/20 08:23





  Flush - Normal Saline  IVF   10 ml





  Q12HR MILEY   Administration





     





     





     





     


 


Valsartan  80 mg  02/06/20 21:00  02/11/20 08:21





  Diovan  PO   80 mg





  BID MILEY   Administration





     





     





     





     














- Exam


General Appearance: NAD


Heart: RRR, no gallops


Respiratory: no rales, rhonchi, wheezes


Gastrointestinal: soft, non-tender, normal bowel sounds


Extremities: no cyanosis





Hosp A/P





- Plan


DVT proph w/lovenox





Acute respiratory failure with hypoxemia


Unstable angina


COPD exacerbation


HTN


CAD


Polysubstance abuse


Other issues per previous notes





PLAN:


Cont ASA/Plavix with Lovenox per Cardiology


Reduce Clonidine to 0.1 mg BID


Cont Nebs Q4h withAtbx/Steroids


Cont Lantus 20 units QAM & 10 units HS with moderate sliding scale


Cont Valsartan/Imdur/Coreg


Amlodipine changed to Procardia XL


AM labs

## 2020-02-12 NOTE — PRG
DATE OF SERVICE:  02/12/2020



SUBJECTIVE:  Ms. Villarreal is feeling better.  She is not having any recurrent chest

pain.  She is wheezing, however, a lot. 



OBJECTIVE:  VITAL SIGNS:  Her blood pressure has been variable.  Last night, it

99/59, most recently 140/80.  Pulse 76. 

LUNGS:  She has a diffuse expiratory wheezing. 

CARDIAC:  Normal S1 and normal S2. 

ABDOMEN:  Soft and nontender. 

EXTREMITIES:  There is no edema.



ASSESSMENT:  

1. Reactive airway disease, intermittent severe wheezing.

2. Hypertension, improved control.

3. Mild bradycardia, early during this admission.



PLAN:  

1. She is on aspirin and Plavix.

2. We will reduce enoxaparin dose.

3. She is on Procardia XL.

4. Valsartan.

5. The patient seems to be slowly improving.  We will reduce enoxaparin dose.







Job ID:  [id number]

## 2020-02-12 NOTE — PDOC.HOSPP
- Subjective


Encounter Date: 02/12/20


Encounter Time: 09:30


Subjective: 





Patient seen and examined for Resp failure. SOB improving. No new CP. No fever 

or chills. No new complaints. No overnight events





- Objective


Vital Signs & Weight: 


 Vital Signs (12 hours)











  Temp Pulse Resp BP Pulse Ox


 


 02/12/20 18:22   85  16   96


 


 02/12/20 15:08  98.0 F  92  20  127/57 L  96


 


 02/12/20 14:27   76  16   98


 


 02/12/20 12:09  98.4 F  83  20  149/75 H  94 L


 


 02/12/20 10:45   79  16   98








 Weight











Admit Weight                   147 lb 11.2 oz


 


Weight                         158 lb 9.6 oz











 Most Recent Monitor Data











Heart Rate from ECG            108


 


NIBP                           121/71


 


NIBP BP-Mean                   87


 


Respiration from ECG           24


 


SpO2                           96














I&O: 


 











 02/11/20 02/12/20 02/13/20





 06:59 06:59 06:59


 


Intake Total 


 


Output Total 500 1000 1350


 


Balance -260 -640 90











Result Diagrams: 


 02/13/20 04:01





 02/13/20 04:01


Additional Labs: 


 Accuchecks











  02/12/20





  05:45


 


POC Glucose  152 H











EKG Reviewed by me: Yes (Tele SR)





Hospitalist ROS





- Review of Systems


Cardiovascular: denies: chest pain, palpitations, orthopnea, paroxysmal noc. 

dyspnea, edema, light headedness, other


Gastrointestinal: denies: nausea, vomiting, abdominal pain, diarrhea, 

constipation, melena, hematochezia, other





- Medication


Medications: 


Active Medications











Generic Name Dose Route Start Last Admin





  Trade Name Freq  PRN Reason Stop Dose Admin


 


Acetaminophen  650 mg  02/04/20 15:19  02/09/20 16:15





  Tylenol  PO   650 mg





  Q4H PRN   Administration





  Headache/Fever/Mild Pain (1-3)   





     





     





     


 


Hydrocodone Bitart/Acetaminophen  1 tab  02/04/20 15:19  02/10/20 05:40





  [place] 5/325  PO   1 tab





  Q4H PRN   Administration





  Moderate Pain (4-6)   





     





     





     


 


Hydrocodone Bitart/Acetaminophen  1 tab  02/04/20 15:19  02/08/20 20:31





  Norco 7.5/325  PO   1 tab





  Q4H PRN   Administration





  Severe Pain (7-10)   





     





     





     


 


Albuterol/Ipratropium  3 ml  02/04/20 15:24  02/10/20 05:31





  Duoneb  NEB   3 ml





  [id number] PRN   Administration





  SOB &/or Wheezing   





     





     





     


 


Albuterol/Ipratropium  3 ml  02/06/20 11:00  02/12/20 18:22





  Duoneb  EZPAP   3 ml





  [id number] MILEY   Administration





     





     





     





     


 


Aspirin  81 mg  02/05/20 09:00  02/12/20 08:57





  Ecotrin  PO   81 mg





  DAILY MILEY   Administration





     





     





     





     


 


Atorvastatin Calcium  40 mg  02/04/20 21:00  02/12/20 20:46





  Lipitor  PO   40 mg





  HS MILEY   Administration





     





     





     





     


 


Benzonatate  100 mg  02/04/20 15:24  02/05/20 07:42





  Tessalon  PO   100 mg





  Q4H PRN   Administration





  Cough   





     





     





     


 


Bisacodyl  10 mg  02/04/20 15:19  02/05/20 10:50





  Dulcolax  PO   10 mg





  DAILYPRN PRN   Administration





  Constipation   





     





     





     


 


Calcium Carbonate  1,000 mg  02/04/20 15:19  02/07/20 23:30





  Tums  PO   1,000 mg





  Q4H PRN   Administration





  Heartburn  or Indigestion   





     





     





     


 


Carvedilol  6.25 mg  02/10/20 17:00  02/12/20 18:01





  Coreg  PO   6.25 mg





  BID-WM MILEY   Administration





     





     





     





     


 


Cefdinir  300 mg  02/08/20 21:00  02/12/20 20:47





  Omnicef  PO   300 mg





  BID MILEY   Administration





     





     





     





     


 


Clopidogrel Bisulfate  75 mg  02/05/20 09:00  02/12/20 08:57





  Plavix  PO   75 mg





  DAILY MILEY   Administration





     





     





     





     


 


Diphenhydramine HCl  25 mg  02/04/20 15:24  02/12/20 20:47





  Benadryl  PO   25 mg





  Q6H PRN   Administration





  Itching & Insomnia   





     





     





     


 


Doxycycline Hyclate  100 mg  02/08/20 21:00  02/12/20 20:47





  Vibramycin  PO   100 mg





  BID MILEY   Administration





     





     





     





     


 


Enoxaparin Sodium  40 mg  02/12/20 21:00  02/12/20 20:48





  Lovenox  SC   40 mg





  BID MILEY   Administration





     





     





     





     


 


Famotidine  20 mg  02/04/20 21:00  02/12/20 20:47





  Pepcid  PO   20 mg





  BID MILEY   Administration





     





     





     





     


 


Guaifenesin  600 mg  02/06/20 21:00  02/12/20 20:47





  Mucinex  PO   600 mg





  Q12HR MILEY   Administration





     





     





     





     


 


Insulin Glargine 20 units/  0.2 mls @ 0 mls/hr  02/05/20 09:00  02/12/20 09:00





  Miscellaneous Medication  SC   0.2 mls





  QAM MILEY   Administration





     





     





     





     


 


Insulin Glargine 10 units/  0.1 mls @ 0 mls/hr  02/07/20 21:00  02/12/20 20:48





  Miscellaneous Medication  SC   0.1 mls





  HS MILEY   Administration





     





     





     





     


 


Insulin Human Lispro  0 units  02/04/20 15:27  02/12/20 20:50





  Humalog  SC   2 unit





  .BEDTIME SLIDING SC PRN   Administration





  Bedtime Correctional Scale   





     





     





     


 


Insulin Human Lispro  0 units  02/07/20 06:03  02/12/20 18:02





  Humalog  SC   8 unit





  .MODERATE SLIDING SC PRN   Administration





  Moderate Correctional Scale   





     





     





     


 


Isosorbide Mononitrate  60 mg  02/05/20 09:00  02/12/20 08:58





  Imdur  PO   60 mg





  DAILY MILEY   Administration





     





     





     





     


 


Melatonin  3 mg  02/04/20 15:24  02/08/20 20:25





  Melatonin  PO   3 mg





  HS PRN   Administration





  Insomnia   





     





     





     


 


Mometasone Furoate/Formoterol Fumar  1 puff  02/04/20 18:30  02/12/20 18:22





  Dulera 100 Mcg/5 Mcg Inhaler  INH   1 puff





  BID-RT MILEY   Administration





     





     





     





     


 


Nifedipine  60 mg  02/12/20 09:00  02/12/20 08:58





  Procardia Xl  PO   60 mg





  DAILY MILEY   Administration





     





     





     





     


 


Nitroglycerin  0.4 mg  02/04/20 15:18  02/10/20 07:47





  Nitrostat  SL   0.4 mg





  Q5MIN PRN   Administration





  Chest Pain   





     





     





     


 


Nitroglycerin  1 inch  02/06/20 12:00  02/12/20 18:02





  Nitro-Bid 2% Ointment  TOP   1 inch





  Q6HR MILEY   Administration





     





     





     





     


 


Ondansetron HCl  4 mg  02/04/20 15:19  02/05/20 17:48





  Zofran  IVP   4 mg





  Q6H PRN   Administration





  Nausea/Vomiting   





     





     





     


 


Polyethylene Glycol  17 gm  02/07/20 09:00  02/12/20 08:59





  Miralax  PO   Not Given





  DAILY MILEY   





     





     





     





     


 


Prednisone  20 mg  02/09/20 08:00  02/12/20 18:01





  Prednisone  PO   20 mg





  BID-WM MILEY   Administration





     





     





     





     


 


Ranolazine  1,000 mg  02/04/20 21:00  02/12/20 20:47





  Ranexa  PO   1,000 mg





  BID MILEY   Administration





     





     





     





     


 


Sodium Chloride  10 ml  02/06/20 21:00  02/12/20 20:48





  Flush - Normal Saline  IVF   10 ml





  Q12HR MILEY   Administration





     





     





     





     


 


Valsartan  80 mg  02/06/20 21:00  02/12/20 20:47





  Diovan  PO   80 mg





  BID MILEY   Administration





     





     





     





     














- Exam


General Appearance: NAD


Heart: RRR, no gallops


Respiratory: no rales, no ronchi, wheezes


Gastrointestinal: non-tender, non-distended, normal bowel sounds


Extremities: no cyanosis





Hosp A/P





- Plan


DVT proph w/lovenox, DVT proph w/SCDs





Acute respiratory failure with hypoxemia


Unstable angina


COPD exacerbation


HTN


CAD


Polysubstance abuse


Other issues per previous notes





PLAN:


DC Clonidine


Cont Procardia XL


Cont ASA/Plavix


Lovenox dose reduced


Cont Nebs Q4h withAtbx/Steroids


Cont current dose of Lantus with moderate sliding scale


Cont Valsartan/Imdur/Coreg


AM labs


Will need nebulizer for home use due to h/o COPD

## 2020-02-13 NOTE — DIS
DATE OF ADMISSION:  02/04/2020



DATE OF DISCHARGE:  02/13/2020



DISCHARGE DISPOSITION:  Home.



FOLLOWUP:  

1. Follow up with primary care physician, Dr. Bermudez in 1 week.

2. Follow up with Cardiology, Dr. Lee and Pulmonary, Dr. Hurtado in 2 to 3 weeks.



ALLERGIES:  NO KNOWN DRUG ALLERGIES. 



THE PATIENT WAS SEEN AND EXAMINED ON THE DAY OF DISCHARGE.  DENIES ANY NEW

COMPLAINTS.  NO CHEST PAIN, SHORTNESS OF BREATH, OR PALPITATIONS. 



DISCHARGE MEDICATIONS:  

1. Albuterol inhaler as needed.

2. Sublingual nitroglycerin as needed.

3. Aspirin 81 mg daily.

4. Lipitor 40 mg q.h.s.

5. Carvedilol 6.25 mg b.i.d.

6. Plavix 75 mg daily.

7. Lantus 15 units daily.

8. DuoNeb as needed.

9. Isosorbide mononitrate 60 mg daily.

10. Metformin 500 mg twice daily.

11. Dulera 1 puff b.i.d.

12. Procardia XL 60 mg daily.

13. Prednisone taper.

14. Ranexa 1000 mg b.i.d.

15. Valsartan 80 mg b.i.d.



INPATIENT CONSULTANTS:  

1. Cardiology, Dr. Lee.

2. Pulmonary, Dr. Hurtado.



BRIEF HOSPITAL COURSE:  The patient is a 66-year-old female with coronary artery

disease, COPD, and drug abuse, presented to the emergency room with shortness of

breath.  A workup was consistent with acute hypoxic respiratory failure secondary to

COPD exacerbation along with unstable angina.  The patient had several episodes of

chest pain.  Urine drug screen was positive for cocaine and PCP.  She was started on

1 mg/kg of Lovenox.  Aspirin and Plavix were continued.  Beta blockers were

initially held due to bradycardia that has improved.  Clonidine has been

discontinued.  Her medications have been optimized by Cardiology. 



For COPD exacerbation, the patient was placed on IV steroids along with empiric

antibiotics.  Steroids have been changed to p.o. 



The patient was extensively counseled on lifestyle modification including drug

cessation. 



SIGNIFICANT LABORATORY DATA:  Maximum troponin 0.035. 



ABGs showed pH of 7.34 with pCO2 of 34.7, pO2 of 106. 



Echocardiogram showed left ventricular ejection fraction of 45% to 50% with moderate

mitral regurgitation, mild tricuspid regurgitation. 



FINAL DIAGNOSES:  

1. Acute hypoxic respiratory failure.

2. Unstable angina.

3. Chronic obstructive pulmonary disease exacerbation.

4. Hypertension.

5. Coronary artery disease.

6. Polysubstance abuse.

7. Diabetes mellitus type 2.

8. Moderate mitral regurgitation.

9. Mild tricuspid regurgitation.

10. Abnormal TSH.  TSH was 0.179 with a free T4 of 1.02. 



The patient understands the above plan of care.







Job ID:  [id number]

## 2020-02-13 NOTE — PRG
DATE OF SERVICE:  02/13/2020



SUBJECTIVE:  Ms. Villarreal is feeling much better.  She wants to go home.



OBJECTIVE:  VITAL SIGNS:  Her blood pressures earlier 129/72, now 149/70, pulse 80. 

LUNGS:  There is mild expiratory wheezing, but much better than yesterday. 

CARDIAC:  Normal S1, normal S2. 

ABDOMEN:  Soft, nontender. 

EXTREMITIES:  There is no edema.



ASSESSMENT:  

1. Coronary artery disease, inoperable with distal atherosclerosis.

2. Hypertension, improved.

3. Severe chronic obstructive pulmonary disease.

4. Substance abuse.

5. Tobacco abuse.



PLAN:  

1. She is on aspirin.

2. She is on clopidogrel.

3. Stop nitro paste.

4. She is on carvedilol 6.25 twice a day.

5. Doxycycline.

6. Isosorbide.

7. Procardia XL 60 mg a day.

8. Ranexa.

9. Valsartan. 

Okay to me to be released home when okay with the hospitalist physician.







Job ID:  [id number]

## 2020-02-17 NOTE — PQF
[employer] Crystal [employer] Winform Viewer

MORGAN WILLSON MALIK MD

[phone]                                                             [facility]-
[id number]

[id number]                             

                                   

CLINICAL DOCUMENTATION CLARIFICATION FORM:  POST DISCHARGE



Addendum to original discharge summary date:  __________________________________
____



Late entry note date:  _________________________________________________________
__











DATE:  2/16/20                                       ATTN: Gunnar Hartley





Please exercise your independent, professional judgment in responding to the 
clarification form. 

Clinical indicators are provided on the bottom of this form for your review



Can you please further clarify the condition of the patient based on the 
clinical indicators below?



Please check appropriate box(es):

[  ] Sepsis due to: (Pna, UTI, gangrenous gall bladder, etc.) __________________
_________

[  ] Localized infection without sepsis

[  ] Other diagnosis ___________

[ x ] Unable to determine



In addition, please specify:

Present on Admission (POA):  [  ] Yes             [  ] No             [  ] 
Unable to determine



For continuity of documentation, please document condition throughout progress 
notes and discharge summary.  Thank You.



CLINICAL INDICATORS - SIGNS / SYMPTOMS / LABS

ED Provider pg.6- Patient presents for evaluation of sepsis

ED Provider  pg.8- Diagnosis: acute respiratory distress, Additional: PNA and 
COPD, Sepsis

H and P pg.1- ,Presents with SOB

PN 2/6- likely some degree of bronchopneumonia

Laboratory- WBC 6.2,7.3,7.9,10.3, 13.4H





RISK FACTORS

COPD exacerbation- H and P pg.2

Acute respiratory failure- H and P pg.2

CAD- H and P pg.2

HTN- H and P pg.1

CHF- H and P pg.1

Bronchitis- PM Dr. Nielsen





TREATMENTS:

Pulmonary Consult- Dr. Hurtado 2/5

Chest X ray 2/4

IV Antibiotics- MAR

IV fluifd- MAR

O2 Supplementation

BiPAP- ED Provider











(This form is maintained as a part of the permanent medical record)

2015 [employer]  All Rights Reserved

[email or web address]    [phone]

                                                              



 

GELACIO

## 2020-02-18 NOTE — PQF
[employer] Winform Viewer

MORGAN WILLSON MALIK MD

[id number]                                                             [facility]-
B08

[id number]                             

                                   

CLINICAL DOCUMENTATION CLARIFICATION FORM:  POST DISCHARGE



Addendum to original discharge summary date:  __________________________________
____



Late entry note date:  _________________________________________________________
__











DATE: 2/18/20                                                    ATTN: Gunnar Hartley







Please exercise your independent, professional judgment in responding to the 
clarification form. 

Clinical indicators are provided on the bottom of this form for your review





Can sanchez please further clarify if Bronchopneumonia is ruled in or ruled out?



Bronchopneumonia

[  ] Ruled in diagnosis

     [  ] Continue to treat        [  ] Resolved

[ x ] Ruled out diagnosis

[  ] Cannot rule out diagnosis

[  ] Other diagnosis ___________

[  ] Unable to determine



In addition, please specify:

Present on Admission (POA):  [  ] Yes             [  ] No             [  ] 
Unable to determine



For continuity of documentation, please document condition throughout progress 
notes and discharge summary.  Thank You.



CLINICAL INDICATORS - SIGNS / SYMPTOMS / LABS

ED Provider  pg.8- Diagnosis: acute respiratory distress, Additional: PNA and 
COPD, Sepsis

H and P pg.1- Presents with SOB

PN 2/6- likely some degree of bronchopneumonia

Laboratory- WBC 6.2,7.3,7.9,10.3, 13.4H

PN p1 2/5 Dr. Ye Vital signs: /64; WY 96; RR 22; Temp 98.4

Chest  Xray 2/5- chronic lung parenchymal changes

DS pg.2- "Acute respiratory failure 2/2  acute COPD exacerbation"





RISK FACTORS

COPD exacerbation- H and P pg.2

Acute respiratory failure- H and P pg.2

CAD- H and P pg.2

HTN- H and P pg.1

CHF- H and P pg.1

Bronchitis- PM Dr. Nielsen







TREATMENTS

Pulmonary Consult- Dr. Hurtado 2/5

Chest X ray 2/4

IV Antibiotics- MAR

IV fluids- MAR

O2 Supplementation

BiPAP- ED Provider









(This form is maintained as a part of the permanent medical record)

2015 [employer].  All Rights Reserved

[email or web address]    [phone]

                                                              



 

[email or web address]